# Patient Record
Sex: FEMALE | Race: OTHER | HISPANIC OR LATINO | Employment: FULL TIME | URBAN - METROPOLITAN AREA
[De-identification: names, ages, dates, MRNs, and addresses within clinical notes are randomized per-mention and may not be internally consistent; named-entity substitution may affect disease eponyms.]

---

## 2017-08-07 ENCOUNTER — INITIAL PRENATAL (OUTPATIENT)
Dept: OBGYN | Facility: CLINIC | Age: 27
End: 2017-08-07
Payer: MEDICAID

## 2017-08-07 VITALS
SYSTOLIC BLOOD PRESSURE: 116 MMHG | HEIGHT: 63 IN | WEIGHT: 164 LBS | BODY MASS INDEX: 29.06 KG/M2 | DIASTOLIC BLOOD PRESSURE: 68 MMHG

## 2017-08-07 DIAGNOSIS — O09.292 HISTORY OF POSTPARTUM HEMORRHAGE, CURRENTLY PREGNANT, SECOND TRIMESTER: ICD-10-CM

## 2017-08-07 DIAGNOSIS — Z34.90 ENCOUNTER FOR SUPERVISION OF NORMAL PREGNANCY, ANTEPARTUM, UNSPECIFIED GRAVIDITY: Primary | ICD-10-CM

## 2017-08-07 DIAGNOSIS — Z92.89 HISTORY OF BLOOD TRANSFUSION: ICD-10-CM

## 2017-08-07 DIAGNOSIS — O34.219 HISTORY OF CESAREAN DELIVERY, CURRENTLY PREGNANT: ICD-10-CM

## 2017-08-07 PROBLEM — O09.299 HISTORY OF POSTPARTUM HEMORRHAGE, CURRENTLY PREGNANT: Status: ACTIVE | Noted: 2017-08-07

## 2017-08-07 LAB
APPEARANCE UR: NORMAL
BILIRUB UR STRIP-MCNC: NORMAL MG/DL
COLOR UR AUTO: NORMAL
GLUCOSE UR STRIP.AUTO-MCNC: NEGATIVE MG/DL
KETONES UR STRIP.AUTO-MCNC: NEGATIVE MG/DL
LEUKOCYTE ESTERASE UR QL STRIP.AUTO: NEGATIVE
NITRITE UR QL STRIP.AUTO: NEGATIVE
PH UR STRIP.AUTO: 6.5 [PH] (ref 5–8)
PROT UR QL STRIP: NEGATIVE MG/DL
RBC UR QL AUTO: NEGATIVE
SP GR UR STRIP.AUTO: 1
UROBILINOGEN UR STRIP-MCNC: NORMAL MG/DL

## 2017-08-07 PROCEDURE — 81002 URINALYSIS NONAUTO W/O SCOPE: CPT | Performed by: NURSE PRACTITIONER

## 2017-08-07 PROCEDURE — 59401 PR NEW OB VISIT: CPT | Performed by: NURSE PRACTITIONER

## 2017-08-07 ASSESSMENT — ENCOUNTER SYMPTOMS
NEUROLOGICAL NEGATIVE: 1
CONSTITUTIONAL NEGATIVE: 1
RESPIRATORY NEGATIVE: 1
PSYCHIATRIC NEGATIVE: 1
EYES NEGATIVE: 1
MUSCULOSKELETAL NEGATIVE: 1
CARDIOVASCULAR NEGATIVE: 1
GASTROINTESTINAL NEGATIVE: 1

## 2017-08-07 NOTE — PROGRESS NOTES
"S:  Bisi BLAKE is a 27 y.o.  who presents for her new OB exam.  She is 13w1d with and PAMELA of Estimated Date of Delivery: 18 based off of LMP . She has no complaints.  She is currently working at unemployed. Discussed heavy lifting and chemical exposure. No ER visits or previous care in this pregnancy.     Desires AFP.  Declines CF.  Denies VB, LOF, or cramping.  Denies dysuria, vaginal DC. Reports some fetal movement.     Pt is  and lives with  and children.  Pregnancy is planned and desired. Desires BTL with this c/s.      Past Medical History   Diagnosis Date   • Migraine    • History of  10/9/2009     History reviewed. No pertinent family history.  Social History     Social History   • Marital Status: Single     Spouse Name: N/A   • Number of Children: N/A   • Years of Education: N/A     Occupational History   • Not on file.     Social History Main Topics   • Smoking status: Never Smoker    • Smokeless tobacco: Not on file   • Alcohol Use: No   • Drug Use: No   • Sexual Activity:     Partners: Male      Comment: Planned pregnancy     Other Topics Concern   • Not on file     Social History Narrative     OB History    Para Term  AB SAB TAB Ectopic Multiple Living   3 2 2       2      # Outcome Date GA Lbr Benji/2nd Weight Sex Delivery Anes PTL Lv   3 Current            2 Term 14 39w0d  2.807 kg (6 lb 3 oz) M CS-LTranv Spinal  Y      Comments: repeat c/section   1 Term 09 40w0d  3.629 kg (8 lb) F CS-LTranv Spinal N Y      Comments: Failure to progress          History of Varicella Virus: yes  History of HSV I or II in self or partner: no  History of Thyroid problems: no    O:  Blood pressure 116/68, height 1.59 m (5' 2.6\"), weight 74.39 kg (164 lb), last menstrual period 2017, currently breastfeeding.   See Prenatal Physical.    Wet mount: deferred, no s/sx      A:   1.  IUP @ 13w1d per LMP        2.  S=D        3.  See problem list below     " "         Patient Active Problem List    Diagnosis Date Noted   • History of  delivery, currently pregnant 2017   • History of postpartum hemorrhage, currently pregnant 2017         P:  1.  GC/CT & pap done        2.  Prenatal labs ordered - lab slip given        3.  Discussed PNV, diet, avoidances and adequate water intake        4.  NOB packet given        5.  Return to office in 4 wks        6.  Complete OB US in 6-7 wks           No orders of the defined types were placed in this encounter.       HPI    Review of Systems   Constitutional: Negative.    HENT: Negative.    Eyes: Negative.    Respiratory: Negative.    Cardiovascular: Negative.    Gastrointestinal: Negative.    Genitourinary: Negative.    Musculoskeletal: Negative.    Skin: Negative.    Neurological: Negative.    Endo/Heme/Allergies: Negative.    Psychiatric/Behavioral: Negative.    All other systems reviewed and are negative.         Objective:     /68 mmHg  Ht 1.59 m (5' 2.6\")  Wt 74.39 kg (164 lb)  BMI 29.43 kg/m2  LMP 2017     Physical Exam   Constitutional: She is oriented to person, place, and time. She appears well-developed and well-nourished.   HENT:   Head: Normocephalic and atraumatic.   Nose: Nose normal.   Eyes: Conjunctivae and EOM are normal.   Neck: Normal range of motion. Neck supple.   Cardiovascular: Normal rate, regular rhythm, normal heart sounds and intact distal pulses.    Pulmonary/Chest: Effort normal and breath sounds normal.   Abdominal: Soft. Bowel sounds are normal.   Genitourinary: Vagina normal. Uterus is enlarged.   Musculoskeletal: Normal range of motion.   Neurological: She is alert and oriented to person, place, and time. She has normal reflexes.   Skin: Skin is warm and dry.   Psychiatric: She has a normal mood and affect. Her behavior is normal. Judgment and thought content normal.   Nursing note and vitals reviewed.         Assessment/Plan:     1. Encounter for supervision of " normal pregnancy, antepartum, unspecified   PAMELA 18 per LMP  - POCT Urinalysis  - US-OB 2ND 3RD TRI COMPLETE; Future  - PREG CNTR PRENATAL PN; Future  - THINPREP RFLX HPV ASCUS W/CTNG; Future    2. History of  delivery, currently pregnant  C/S x 2, desires repeat with BTL    3. History of postpartum hemorrhage, currently pregnant, second trimester  Required transfusion, 2U PRBC's

## 2017-08-07 NOTE — Clinical Note
Cystic Fibrosis Carrier Testing  Bisi BLAKE    The following information is about a blood test that can be done to determine if you and/or your partner carry the gene for cystic fibrosis.    WHAT IS CYSTIC FIBROSIS?  · Cystic fibrosis (CF) is an inherited disease that affects more than 25,000 American children and young adults.  · Symptoms of CF vary but include lung congestion, pneumonia, diarrhea and poor growth.  Most people with CF have severe medical problems and some die at a young age.  Others have so few symptoms they are unaware they have CF.  · CF does not affect intelligence.  · Although there is no cure for CF at this time, scientists are making progress in improving treatment and in searching for a cure.  In the past many people with CF  at a very young age.  Today, many are living into their 20’s and 30’s.    IS THERE A CHANCE MY BABY COULD HAVE CYSTIC FIBROSIS?  · You can have a child with CF even if there is no history in your family (see chart below).  · CF testing can help determine if you are a carrier and at risk to have a child with CF.  Note: if both parents are carriers, there is a 1 in 4 (25%) chance with each pregnancy that they will have a child with CF.  · Carriers have one normal CF gene and one altered CF gene.  · People with CF have two altered CF genes.  · Most people have two normal copies of the CF gene.    Approximate risk that a couple with no family history of cystic fibrosis will have a child with cystic fibrosis:    Ethnic background / Risk     couple:  1 in 2,500   couple:  1 in 15,000            couple:  1 in 8,000     American couple:  1 in 32,000     WHAT TESTING IS AVAILABLE?  · There is a blood test that can be done to find out if you or your partner is a carrier.  · It is important to understand that CF carrier testing does not detect all CF carriers.  · If the test shows that you are both CF carriers, you unborn baby  can be tested to find out if the baby has CF.    HOW MUCH DOES IT COST TO HAVE CYSTIC FIBROSIS CARRIER TESTING?  · Cost and insurance coverage for CF carrier testing vary depending upon the laboratory used and your insurance policy.  · The average cost for CF carrier testing is $780.00 per person.  · Your genetic counselor can provide you with more information about cystic fibrosis carrier testing.    _____  Yes, I am interested in discussing carrier testing with a genetic counselor.    _____  No, I am not interested in CF carrier testing or in receiving more information about CF carrier testing.      Client signature: ________________________________________  8/7/2017

## 2017-08-07 NOTE — PROGRESS NOTES
Pt here today for NOB visit  LMP: 08/07/2017  WT: 164 lb  BP: 116/68  Preferred pharmacy verified  Pt states she get a lot of nausea and numbness on fingers.   Pt unable to void for urinalysis.   Good # [phone]

## 2017-08-07 NOTE — MR AVS SNAPSHOT
"        Bisi BLAKE   2017 9:30 AM   Initial Prenatal   MRN: [id number]    [facility]   Dept Phone:  [phone]    Description:  Female : 1990   Provider:  Faith West C.N.M.; [facility] INTA[facility]           Allergies as of 2017     No Known Allergies      You were diagnosed with     Encounter for supervision of normal pregnancy, antepartum, unspecified    [8934027]  -  Primary     History of  delivery, currently pregnant   [[id number]]       History of postpartum hemorrhage, currently pregnant, second trimester   [378336]   required blood transfusion    History of blood transfusion   [[id number]]         Vital Signs     Blood Pressure Height Weight Body Mass Index Last Menstrual Period Smoking Status    116/68 mmHg 1.59 m (5' 2.6\") 74.39 kg (164 lb) 29.43 kg/m2 2017 Never Smoker       Basic Information     Date Of Birth Sex Race Ethnicity Preferred Language    1990 Female  or   Origin (Polish,Tuvaluan,German,Zac, etc) English      Your appointments     Sep 05, 2017 10:00 AM   OB Follow Up with Avani Mcbride D.N.P.   [facility] [address]  [facility] [address] [phone]              Problem List              ICD-10-CM Priority Class Noted - Resolved    History of  delivery, currently pregnant O34.219   2017 - Present    History of postpartum hemorrhage, currently pregnant O09.299   2017 - Present    History of blood transfusion Z92.89   2017 - Present      Health Maintenance        Date Due Completion Dates    IMM HEP B VACCINE (1 of 3 - Primary Series) 1990 ---    IMM HEP A VACCINE (1 of 2 - Standard Series) 3/24/1991 ---    IMM VARICELLA (CHICKENPOX) VACCINE (1 of 2 - 2 Dose Adolescent Series) 3/24/2003 ---    PAP SMEAR 10/9/2016 10/9/2013    IMM INFLUENZA (1) 2017 ---    IMM DTaP/Tdap/Td Vaccine (2 - Td) 2024            Current Immunizations    " MMR/Varicella Combined Vaccine  Incomplete    Tdap Vaccine 4/9/2014  4:50 AM,  Incomplete      Below and/or attached are the medications your provider expects you to take. Review all of your home medications and newly ordered medications with your provider and/or pharmacist. Follow medication instructions as directed by your provider and/or pharmacist. Please keep your medication list with you and share with your provider. Update the information when medications are discontinued, doses are changed, or new medications (including over-the-counter products) are added; and carry medication information at all times in the event of emergency situations     Allergies:  No Known Allergies          Medications  Valid as of: August 07, 2017 - 10:55 AM    Generic Name Brand Name Tablet Size Instructions for use    Prenatal MV-Min-Fe Fum-FA-DHA   Take  by mouth.        .                 Medicines prescribed today were sent to:     [facility] [address] [place] [address] - [address]    [address] [facility] [address]    Phone: [phone] Fax: [phone]    Open 24 Hours?: No      Medication refill instructions:       If your prescription bottle indicates you have medication refills left, it is not necessary to call your provider’s office. Please contact your pharmacy and they will refill your medication.    If your prescription bottle indicates you do not have any refills left, you may request refills at any time through one of the following ways: The online [employer] system (except Urgent Care), by calling your provider’s office, or by asking your pharmacy to contact your provider’s office with a refill request. Medication refills are processed only during regular business hours and may not be available until the next business day. Your provider may request additional information or to have a follow-up visit with you prior to refilling your medication.   *Please Note: Medication refills are assigned a new Rx number when refilled  electronically. Your pharmacy may indicate that no refills were authorized even though a new prescription for the same medication is available at the pharmacy. Please request the medicine by name with the pharmacy before contacting your provider for a refill.        Your To Do List     Future Labs/Procedures Complete By Expires    PREG CNTR PRENATAL PN  As directed 8/8/2018    THINPREP RFLX HPV ASCUS W/CTNG  As directed 8/7/2017    US-OB 2ND 3RD TRI COMPLETE  As directed 2/7/2018      Other Notes About Your Plan     BTL desired           MyChart Access Code: Activation code not generated  Current MyChart Status: Active

## 2017-08-09 LAB
C TRACH DNA SPEC QL NAA+PROBE: NORMAL
N GONORRHOEA DNA SPEC QL NAA+PROBE: NORMAL
PHYSICIAN READ PAP  881411: NORMAL

## 2017-08-16 LAB
ABO GROUP BLD: NORMAL
BLD GP AB SCN SERPL QL: NEGATIVE
HBV SURFACE AG SERPL QL IA: NON REACTIVE
HCT VFR BLD AUTO: 43.5 %
HGB BLD-MCNC: 14.1 G/DL
HIV 1 0 2 IC ZHVIC: NON REACTIVE
PLATELET # BLD AUTO: 248 10*3/UL
RH BLD: POSITIVE
RPR SER QL: NON REACTIVE
RUBV IGG SERPL IA-ACNC: NORMAL

## 2017-09-05 ENCOUNTER — ROUTINE PRENATAL (OUTPATIENT)
Dept: OBGYN | Facility: CLINIC | Age: 27
End: 2017-09-05
Payer: MEDICAID

## 2017-09-05 VITALS — DIASTOLIC BLOOD PRESSURE: 62 MMHG | WEIGHT: 169 LBS | BODY MASS INDEX: 30.32 KG/M2 | SYSTOLIC BLOOD PRESSURE: 108 MMHG

## 2017-09-05 DIAGNOSIS — O34.219 HISTORY OF CESAREAN DELIVERY, CURRENTLY PREGNANT: ICD-10-CM

## 2017-09-05 PROCEDURE — 90040 PR PRENATAL FOLLOW UP: CPT | Performed by: NURSE PRACTITIONER

## 2017-09-05 NOTE — PROGRESS NOTES
Ob f/u. + fetal movement   No VB, LOF or contractions   C/O no complaints today   Phone number #   Pharmacy verified with patient  WT= 136 lbs             VC=376/62  BTL signed today   AFP ordered

## 2017-09-05 NOTE — PROGRESS NOTES
SUBJECTIVE:  Pt is a 27 y.o.   at 17w2d  gestation. Presents today for follow-up prenatal care. Reports no issues at this time.  Reports positive  fetal movement. Denies cramping/contractions, bleeding or leaking of fluid. Denies dysuria, headaches, N/V, or other issues at this time. Generally feels well today.     OBJECTIVE:  - See prenatal vitals flow  -   Vitals:    17 0946   BP: 108/62   Weight: 76.7 kg (169 lb)      - Pertinent Labs:normal prenatal panel  - Pertinent ultrasound: scheduled.            ASSESSMENT:   - IUP at 17w2d   - S=D   -   Patient Active Problem List    Diagnosis Date Noted   • History of  delivery, currently pregnant 2017   • History of postpartum hemorrhage, currently pregnant 2017   • History of blood transfusion 2017         PLAN:  - S/sx pregnancy and labor warning signs vs general discomforts discussed  - Fetal movements and kick counts reviewed   - Adequate hydration reinforced  - Nutrition/exercise/vitamin education: continued PNV  Lab slip and instructions for AFP

## 2017-10-02 ENCOUNTER — ROUTINE PRENATAL (OUTPATIENT)
Dept: OBGYN | Facility: CLINIC | Age: 27
End: 2017-10-02
Payer: MEDICAID

## 2017-10-02 VITALS — BODY MASS INDEX: 31.04 KG/M2 | SYSTOLIC BLOOD PRESSURE: 110 MMHG | WEIGHT: 173 LBS | DIASTOLIC BLOOD PRESSURE: 72 MMHG

## 2017-10-02 DIAGNOSIS — O34.219 HISTORY OF CESAREAN DELIVERY, CURRENTLY PREGNANT: ICD-10-CM

## 2017-10-02 PROCEDURE — 90040 PR PRENATAL FOLLOW UP: CPT | Performed by: NURSE PRACTITIONER

## 2017-10-02 NOTE — PROGRESS NOTES
Ob f/u. + fetal movement, baby is moving already   No VB, LOF or contractions   C/O no complaints today   Phone number # [phone]  Pharmacy verified with patient  WT=  173 lbs            RD=881/72  Flu shot offered, pt declines

## 2017-10-05 ENCOUNTER — APPOINTMENT (OUTPATIENT)
Dept: RADIOLOGY | Facility: IMAGING CENTER | Age: 27
End: 2017-10-05
Attending: NURSE PRACTITIONER
Payer: MEDICAID

## 2017-10-05 DIAGNOSIS — Z34.90 ENCOUNTER FOR SUPERVISION OF NORMAL PREGNANCY, ANTEPARTUM, UNSPECIFIED GRAVIDITY: ICD-10-CM

## 2017-10-05 PROCEDURE — 76805 OB US >/= 14 WKS SNGL FETUS: CPT | Mod: 26 | Performed by: OBSTETRICS & GYNECOLOGY

## 2017-10-06 ENCOUNTER — DATING (OUTPATIENT)
Dept: OBGYN | Facility: CLINIC | Age: 27
End: 2017-10-06

## 2017-10-30 ENCOUNTER — ROUTINE PRENATAL (OUTPATIENT)
Dept: OBGYN | Facility: CLINIC | Age: 27
End: 2017-10-30
Payer: MEDICAID

## 2017-10-30 VITALS — SYSTOLIC BLOOD PRESSURE: 112 MMHG | BODY MASS INDEX: 32.3 KG/M2 | WEIGHT: 180 LBS | DIASTOLIC BLOOD PRESSURE: 66 MMHG

## 2017-10-30 DIAGNOSIS — O34.219 HISTORY OF CESAREAN DELIVERY, CURRENTLY PREGNANT: ICD-10-CM

## 2017-10-30 PROCEDURE — 90040 PR PRENATAL FOLLOW UP: CPT | Performed by: NURSE PRACTITIONER

## 2017-10-30 ASSESSMENT — PATIENT HEALTH QUESTIONNAIRE - PHQ9: CLINICAL INTERPRETATION OF PHQ2 SCORE: 0

## 2017-10-30 NOTE — PROGRESS NOTES
SUBJECTIVE:  Pt is a 27 y.o.   at 22w1d  gestation. Presents today for follow-up prenatal care. Reports no issues at this time.  Reports positive  fetal movement. Denies cramping/contractions, bleeding or leaking of fluid. Denies dysuria, headaches, N/V, or other issues at this time. Generally feels well today.     OBJECTIVE:  - See prenatal vitals flow  -   Vitals:    10/30/17 1348   BP: 112/66   Weight: 81.6 kg (180 lb)      - Pertinent Labs: normal prenatal panel, no AFP done  - Pertinent ultrasound: Normal fetal survey but due date change.            ASSESSMENT:   - IUP at 22w1d    - S=D   -   Patient Active Problem List    Diagnosis Date Noted   • History of  delivery, currently pregnant 2017   • History of postpartum hemorrhage, currently pregnant 2017   • History of blood transfusion 2017         PLAN:  - S/sx pregnancy and labor warning signs vs general discomforts discussed  - Fetal movements and kick counts reviewed   - Adequate hydration reinforced  - Nutrition/exercise/vitamin education: continued PNV  - Encouraged tour of LnD/childbirth education classes: contact info provided   - Anticipates repeat c/s and BTL. Anticipates glucose testing next visit.

## 2017-10-30 NOTE — PROGRESS NOTES
Ob f/u. + fetal movement baby is moving ok   No VB, LOF or contractions   C/O no complaints today   Phone number # [phone]  Pharmacy verified with patient  WT=  180 lbs            OB=049/66  Pt declines flu shot

## 2017-12-07 ENCOUNTER — ROUTINE PRENATAL (OUTPATIENT)
Dept: OBGYN | Facility: CLINIC | Age: 27
End: 2017-12-07
Payer: MEDICAID

## 2017-12-07 VITALS — DIASTOLIC BLOOD PRESSURE: 62 MMHG | BODY MASS INDEX: 32.66 KG/M2 | WEIGHT: 182 LBS | SYSTOLIC BLOOD PRESSURE: 100 MMHG

## 2017-12-07 DIAGNOSIS — O34.219 HISTORY OF CESAREAN DELIVERY, CURRENTLY PREGNANT: ICD-10-CM

## 2017-12-07 PROCEDURE — 90040 PR PRENATAL FOLLOW UP: CPT | Performed by: NURSE PRACTITIONER

## 2017-12-07 NOTE — PROGRESS NOTES
Ob f/u. + fetal movement baby is moving ok   No VB, LOF or contractions   C/O no complaints today   Phone number # [phone]  Pharmacy verified with patient  JF=581 lbs              SA=[id number] given today with instructions   Pt declines tdap   BTL signed  today

## 2017-12-07 NOTE — PROGRESS NOTES
SUBJECTIVE:  Pt is a 27 y.o.   at 27w4d  gestation. Presents today for follow-up prenatal care. Reports no issues at this time.  Reports good  fetal movement. Denies cramping/contractions, bleeding or leaking of fluid. Denies dysuria, headaches, N/V, or other issues at this time. Generally feels well today. Pt reports wanting to get Tdap Pp; education on benefits in pregnancy but pt still declined.     OBJECTIVE:  - See prenatal vitals flow  Vitals:    17 0836   BP: 100/62   Weight: 82.6 kg (182 lb)        ASSESSMENT:   - IUP at 27w4d by 18  week US   - S=D  Patient Active Problem List    Diagnosis Date Noted   • History of  delivery, currently pregnant 2017   • History of postpartum hemorrhage, currently pregnant 2017   • History of blood transfusion 2017         PLAN:  - GCT ordered today   - BTL signed for repeat c/s  - S/sx pregnancy and labor warning signs vs general discomforts discussed  - Fetal movements and kick counts reviewed   - Adequate hydration reinforced  - Nutrition/exercise/vitamin education: continued PNV  - Encouraged tour of LnD/childbirth education classes: contact info provided   - Declines TDAP vacc and Flu vacc  - Anticipatory guidance given  - RTC in 2 weeks for follow-up prenatal care

## 2017-12-07 NOTE — LETTER
"Count Your Baby's Movements  Another step to a healthy delivery    Bisi Mondragon              Dept: [phone]    How Many Weeks Pregnant? 27w4d    Date to Begin Countin2017              How to use this chart    One way for your physician to keep track of your baby's health is by knowing how often the baby moves (or \"kicks\") in your womb.  You can help your physician to do this by using this chart every day.    Every day, you should see how many hours it takes for your baby to move 10 times.  Start in the morning, as soon as you get up.    · First, write down the time your baby moves until you get to 10.  · Check off one box every time your baby moves until you get to 10.  · Write down the time you finished counting in the last column.  · Total how long it took to count up all 10 movements.  · Finally, fill in the box that shows how long this took.  After counting 10 movements, you no longer have to count any more that day.  The next morning, just start counting again as soon as you get up.    What should you call a \"movement\"?  It is hard to say, because it will feel different from one mother to another and from one pregnancy to the next.  The important thing is that you count the movements the same way throughout your pregnancy.  If you have more questions, you should ask your physician.    Count carefully every day!  SAMPLE:  Week 28    How many hours did it take to feel 10 movements?       Start  Time     1     2     3     4     5     6     7     8     9     10   Finish Time   Mon 8:20 ·  ·  ·  ·  ·  ·  ·  ·  ·  ·  11:40                  Sat               Sun                 IMPORTANT: You should contact your physician if it takes more than two hours for you to feel 10 movements.  Each morning, write down the time and start to count the movements of your baby.  Keep track by checking off one box every time you feel one movement.  When you have " "felt 10 \"kicks\", write down the time you finished counting in the last column.  Then fill in the   box (over the check abby) for the number of hours it took.  Be sure to read the complete instructions on the previous page.            "

## 2017-12-07 NOTE — PROGRESS NOTES
Ob f/u. + fetal movement   No VB, LOF or contractions   C/O  Phone number #   Pharmacy verified with patient  WT=             BP=  SHELBY given today with instructions   tdap offered today  BTL offered today

## 2017-12-19 LAB
GLUCOSE 1H P 50 G GLC PO SERPL-MCNC: NORMAL MG/DL
HCT VFR BLD AUTO: NORMAL %
HGB BLD-MCNC: NORMAL G/DL
TREPONEMA PALLIDUM IGG+IGM AB [PRESENCE] IN SERUM OR PLASMA BY IMMUNOASSAY: NOT DETECTED

## 2017-12-21 ENCOUNTER — ROUTINE PRENATAL (OUTPATIENT)
Dept: OBGYN | Facility: CLINIC | Age: 27
End: 2017-12-21
Payer: MEDICAID

## 2017-12-21 VITALS — DIASTOLIC BLOOD PRESSURE: 62 MMHG | WEIGHT: 182 LBS | SYSTOLIC BLOOD PRESSURE: 118 MMHG | BODY MASS INDEX: 32.66 KG/M2

## 2017-12-21 DIAGNOSIS — O34.219 HISTORY OF CESAREAN DELIVERY, CURRENTLY PREGNANT: ICD-10-CM

## 2017-12-21 PROCEDURE — 90040 PR PRENATAL FOLLOW UP: CPT | Performed by: NURSE PRACTITIONER

## 2017-12-21 NOTE — PROGRESS NOTES
Ob f/u. + fetal movement good  No VB, LOF or contractions   C/O no complaints today  Phone number # [phone]  Pharmacy verified with patient  WT= 182 lbs             QV=494/62  Pt would like to know about her labs results

## 2017-12-21 NOTE — PROGRESS NOTES
SUBJECTIVE:  Pt is a 27 y.o.   at 29w4d  gestation. Presents today for follow-up prenatal care. Reports no issues at this time.  Reports good  fetal movement. Denies cramping/contractions, bleeding or leaking of fluid. Denies dysuria, headaches, N/V, or other issues at this time. Generally feels well today. Reports she ate something different last nigth and had diarrhea and nausea but it has resolved this morning. No fever.     OBJECTIVE:  - See prenatal vitals flow  Vitals:    17 0827   BP: 118/62   Weight: 82.6 kg (182 lb)               ASSESSMENT:   - IUP at 29w4d by 18 week US   - S=D  Patient Active Problem List    Diagnosis Date Noted   • History of  delivery, currently pregnant 2017   • History of postpartum hemorrhage, currently pregnant 2017   • History of blood transfusion 2017         PLAN:  - BRAT diet and adequate hydration teaching   - S/sx pregnancy and labor warning signs vs general discomforts discussed  - Fetal movements and kick counts reviewed   - Adequate hydration reinforced  - Nutrition/exercise/vitamin education; continued PNV  - Declines TDAP vacc and Flu vacc  - Anticipatory guidance given  - RTC in 2 weeks for follow-up prenatal care

## 2018-01-05 ENCOUNTER — ROUTINE PRENATAL (OUTPATIENT)
Dept: OBGYN | Facility: CLINIC | Age: 28
End: 2018-01-05
Payer: MEDICAID

## 2018-01-05 VITALS — SYSTOLIC BLOOD PRESSURE: 116 MMHG | WEIGHT: 186 LBS | DIASTOLIC BLOOD PRESSURE: 70 MMHG | BODY MASS INDEX: 33.37 KG/M2

## 2018-01-05 DIAGNOSIS — O34.219 HISTORY OF CESAREAN DELIVERY, CURRENTLY PREGNANT: ICD-10-CM

## 2018-01-05 DIAGNOSIS — O09.893 SUPERVISION OF OTHER HIGH RISK PREGNANCIES, THIRD TRIMESTER: Primary | ICD-10-CM

## 2018-01-05 PROCEDURE — 90040 PR PRENATAL FOLLOW UP: CPT | Performed by: NURSE PRACTITIONER

## 2018-01-05 NOTE — PROGRESS NOTES
Pt here today for OB follow up  Pt states been having some cramping  Reports +ALLEN Mejias # [phone]  Pharmacy Confirmed.

## 2018-01-05 NOTE — PROGRESS NOTES
S:  Pt is  at 31w5d for routine OB follow up.  Reports an occ cramp and low back pain.  Reports good FM.  Denies VB, LOF, RUCs or vaginal DC.    O:  Please see above vitals.        FHTs: 143        Fundal ht: 32 cm.    A:  IUP at 31w5d  Patient Active Problem List    Diagnosis Date Noted   • History of  delivery, currently pregnant 2017   • History of postpartum hemorrhage, currently pregnant 2017   • History of blood transfusion 2017        P:  1.  GBS @ 35 wks.          2.  Continue FKCs.          3.  Questions answered.          4.  Encouraged pt to tour L&D.          5.  Encourage adequate water intake.        6.  F/u 2 wks.        7.  D/w pt helps for low back pain.  Encouraged pregnancy belt.         8.  PTL precautions reviewed w pt.

## 2018-01-19 ENCOUNTER — ROUTINE PRENATAL (OUTPATIENT)
Dept: OBGYN | Facility: CLINIC | Age: 28
End: 2018-01-19
Payer: MEDICAID

## 2018-01-19 VITALS — WEIGHT: 186 LBS | SYSTOLIC BLOOD PRESSURE: 110 MMHG | BODY MASS INDEX: 33.37 KG/M2 | DIASTOLIC BLOOD PRESSURE: 58 MMHG

## 2018-01-19 DIAGNOSIS — O34.219 HISTORY OF CESAREAN DELIVERY, CURRENTLY PREGNANT: ICD-10-CM

## 2018-01-19 PROCEDURE — 90040 PR PRENATAL FOLLOW UP: CPT | Performed by: PHYSICIAN ASSISTANT

## 2018-01-19 NOTE — PROGRESS NOTES
Pt here today for OB follow up  Reports +FM  WT: 186 lb  BP: 110/58  Pt states she had spotting spotting one time 3 days ago, also states noticing more mucus discharge.   Good # [phone]

## 2018-01-19 NOTE — PROGRESS NOTES
Pt has no complaints with cramping, UCs, Vb, LOF, though pt has occ abd tightness only. +FM. Pt had spotting 3 days ago with incr pressure, but none since, pt also notes more vag pressure at that time. PTL precautions stressed. Daily FKC recommended. GBS next visit, also will schedule repeat C/S and BTL at that time. Declines flu and TDap vaccines. RTC 2 wk or sooner prn.

## 2018-01-30 ENCOUNTER — TELEPHONE (OUTPATIENT)
Dept: OBGYN | Facility: CLINIC | Age: 28
End: 2018-01-30

## 2018-01-30 NOTE — TELEPHONE ENCOUNTER
Late Entry--- Pt LM stating she was having 3 UC in an hr on 1-28-18 and after a few hours they have slowed down to once every hr, also reports + mucous discharge and good fetal movement; denies LOF. I consulted with Dr. Roberts and he recommends patient to rest, hydrate and keep an eye on the contractions and time them, if they become 3-5 min apart, has sims than 6 UC in an hr, has heave VB, LOF or baby is not moving then patient needs to go to L&D for further evaluation. I notified patient of above recommendations and she voiced understanding and will comply. Pt had no further questions or concerns.

## 2018-02-02 ENCOUNTER — ROUTINE PRENATAL (OUTPATIENT)
Dept: OBGYN | Facility: CLINIC | Age: 28
End: 2018-02-02
Payer: MEDICAID

## 2018-02-02 VITALS — SYSTOLIC BLOOD PRESSURE: 104 MMHG | WEIGHT: 185 LBS | DIASTOLIC BLOOD PRESSURE: 60 MMHG | BODY MASS INDEX: 33.19 KG/M2

## 2018-02-02 DIAGNOSIS — O34.219 HISTORY OF CESAREAN DELIVERY, CURRENTLY PREGNANT: ICD-10-CM

## 2018-02-02 DIAGNOSIS — O09.893 SUPERVISION OF OTHER HIGH RISK PREGNANCIES, THIRD TRIMESTER: Primary | ICD-10-CM

## 2018-02-02 PROCEDURE — 90040 PR PRENATAL FOLLOW UP: CPT | Performed by: PHYSICIAN ASSISTANT

## 2018-02-02 NOTE — PROGRESS NOTES
Pt. here for Ob f/u and GBS today. Good # [phone]  Good FM  Pt states had contractions on Tuesday 1/30/18 that were 45 minutes apart lasting 3 hours and then we go away.  Pharmacy verified.

## 2018-02-02 NOTE — PROGRESS NOTES
Pt has no complaints with cramping, strong or regular UCs, VB, LOF, though pt notes she has had occ UCs, up to q 45 min, but they always stop. +FM. GBS done today. Referral for C/S placed for 39 wk today. Labor precautions reviewed. Daily FKC and walks recommended. Cervix: Cl/50/floating but vtx by leopolds. RTC 1 wk or sooner prn.

## 2018-02-04 LAB — GP B STREP DNA SPEC QL NAA+PROBE: NORMAL

## 2018-02-09 ENCOUNTER — ROUTINE PRENATAL (OUTPATIENT)
Dept: OBGYN | Facility: CLINIC | Age: 28
End: 2018-02-09
Payer: MEDICAID

## 2018-02-09 VITALS — SYSTOLIC BLOOD PRESSURE: 104 MMHG | BODY MASS INDEX: 33.37 KG/M2 | WEIGHT: 186 LBS | DIASTOLIC BLOOD PRESSURE: 60 MMHG

## 2018-02-09 DIAGNOSIS — O34.219 HISTORY OF CESAREAN DELIVERY, CURRENTLY PREGNANT: ICD-10-CM

## 2018-02-09 DIAGNOSIS — O09.893 SUPERVISION OF OTHER HIGH RISK PREGNANCIES, THIRD TRIMESTER: Primary | ICD-10-CM

## 2018-02-09 PROCEDURE — 90040 PR PRENATAL FOLLOW UP: CPT | Performed by: NURSE PRACTITIONER

## 2018-02-09 NOTE — PATIENT INSTRUCTIONS
P:  1.  Continue FKCs.         2.  Labor precautions given.  Instructions given on where to go.  Pt receptive to education.         3.  D/w pt RCS policy.  Instructions given to pt       4.  Questions answered.         5.  Encouraged adequate water intake       6.  F/u 1wk

## 2018-02-09 NOTE — PROGRESS NOTES
S:  Pt is  at 36w5d here for routine OB follow up.  No c/o.  Reports good FM.  Denies VB, LOF, RUCs, or vaginal DC.     O:  Please see above vitals.        FHTs: 145        Fundal ht: 37        Fetal position: vertex        SVE: deferred        GBS neg on 18 -- reviewed w pt.      A:  IUP at 36w5d  Patient Active Problem List    Diagnosis Date Noted   • Supervision of other high risk pregnancies, third trimester 2018   • History of C/S x 2 - desires repeat and BTL (both consents signed) 2017   • History of postpartum hemorrhage, currently pregnant 2017   • History of blood transfusion 2017       P:  1.  Continue FKCs.         2.  Labor precautions given.  Instructions given on where to go.  Pt receptive to education.         3.  D/w pt RCS policy.  Instructions given to pt       4.  Questions answered.         5.  Encouraged adequate water intake       6.  F/u 1wk

## 2018-02-09 NOTE — PROGRESS NOTES
Pt here today for OB follow up  Pt states no complaints   Reports +FM  Good # [phone]  Pharmacy Confirmed.  GBS Negative  Patient is scheduled for Pre Op on 02/21/18 at 11:30am with Dr Roberts  Patient is scheduled for C/S on 02/25/18 at 12:00pm with Dr Roberts

## 2018-02-15 ENCOUNTER — ROUTINE PRENATAL (OUTPATIENT)
Dept: OBGYN | Facility: CLINIC | Age: 28
End: 2018-02-15
Payer: MEDICAID

## 2018-02-15 VITALS — BODY MASS INDEX: 33.55 KG/M2 | WEIGHT: 187 LBS | DIASTOLIC BLOOD PRESSURE: 70 MMHG | SYSTOLIC BLOOD PRESSURE: 98 MMHG

## 2018-02-15 DIAGNOSIS — O09.893 SUPERVISION OF OTHER HIGH RISK PREGNANCIES, THIRD TRIMESTER: Primary | ICD-10-CM

## 2018-02-15 DIAGNOSIS — O34.219 HISTORY OF CESAREAN DELIVERY, CURRENTLY PREGNANT: ICD-10-CM

## 2018-02-15 PROCEDURE — 90040 PR PRENATAL FOLLOW UP: CPT | Performed by: NURSE PRACTITIONER

## 2018-02-15 NOTE — PROGRESS NOTES
S:  Pt is  at 37w4d here for routine OB follow up.  Reports that she thinks her BP is low in the morning when she gets up.  Reports good FM.  Denies VB, LOF, RUCs, or vaginal DC.     O:  Please see above vitals.        FHTs: 139        Fundal ht: 37        Fetal position: vertex        SVE: deferred        GBS neg on 18 -- reviewed w pt.      A:  IUP at 37w4d  Patient Active Problem List    Diagnosis Date Noted   • Supervision of other high risk pregnancies, third trimester 2018   • History of C/S x 2 - desires repeat and BTL (both consents signed) 2017   • History of postpartum hemorrhage, currently pregnant 2017   • History of blood transfusion 2017       P:  1.  Continue FKCs.         2.  Labor precautions given.  Instructions given on where to go.  Pt receptive to education.         3.  D/w pt RCS policy.  Info given.       4.  Questions answered.         5.  Encouraged adequate water intake       6.  F/u 1wk

## 2018-02-15 NOTE — PATIENT INSTRUCTIONS
P:  1.  Continue FKCs.         2.  Labor precautions given.  Instructions given on where to go.  Pt receptive to education.         3.  D/w pt RCS policy.  Info given.       4.  Questions answered.         5.  Encouraged adequate water intake       6.  F/u 1wk

## 2018-02-21 ENCOUNTER — ROUTINE PRENATAL (OUTPATIENT)
Dept: OBGYN | Facility: CLINIC | Age: 28
End: 2018-02-21
Payer: MEDICAID

## 2018-02-21 VITALS — BODY MASS INDEX: 33.55 KG/M2 | WEIGHT: 187 LBS | SYSTOLIC BLOOD PRESSURE: 98 MMHG | DIASTOLIC BLOOD PRESSURE: 70 MMHG

## 2018-02-21 DIAGNOSIS — O34.219 HISTORY OF CESAREAN DELIVERY, CURRENTLY PREGNANT: ICD-10-CM

## 2018-02-21 PROCEDURE — 90040 PR PRENATAL FOLLOW UP: CPT | Performed by: OBSTETRICS & GYNECOLOGY

## 2018-02-21 NOTE — PROGRESS NOTES
OB Followup;    38w3d    Patient Active Problem List    Diagnosis Date Noted   • Supervision of other high risk pregnancies, third trimester 2018   • History of C/S x 2 - desires repeat and BTL (both consents signed) 2017   • History of postpartum hemorrhage, currently pregnant 2017   • History of blood transfusion 2017       Vitals:    18 1130   BP: (!) 98/70   Weight: 84.8 kg (187 lb)       Patient presents for followup of OB care. Currently doing well . Good fetal movement no leakage of fluid no contractions or vaginal bleeding        Size equals dates, normal fetal heart rate    Repeat  section with bilateral tubal ligation 18 at noon  Preoperative counseling performed-see dictated history and physical      Labor precautions given  Increase oral hydration  Discussed proper weight gain during pregnancy  Continue prenatal vitamins  Increase water intake  Discussed proper exercise and walking  Discussed proper shoe style utilization during pregnancy  Reviewed our group's policy on prenatal visits with all providers in the group to ensure a healthy pregnancy and delivery    Followup in  1 weeks

## 2018-02-21 NOTE — H&P
CHIEF COMPLAINT:  Repeat  section with bilateral tubal ligation.    HISTORY OF PRESENT ILLNESS:  This patient is a 27-year-old  female,    3, para 2-0-0-2, EDC on 2018, currently at 39 weeks'   intrauterine pregnancy.  The patient's OB history is complicated by previous    section x2, and the patient desires repeat  section with   tubal ligation.  The patient's OB history is otherwise uncomplicated.    PAST MEDICAL HISTORY:  Negative.    PAST SURGICAL HISTORY:  Significant for  section x2.    SOCIAL HISTORY:  Tobacco and alcohol use are negative.    ALLERGIES:  No known drug allergies.    PRESENT MEDICATIONS:  Include prenatal vitamins.    PHYSICAL EXAMINATION:  VITAL SIGNS:  On admission, vital signs stable.  Patient is afebrile.  GENERAL:  This patient is a well-developed, well-nourished female in no   apparent distress.  HEENT:  Within normal limits.  CARDIOVASCULAR:  Regular rate and rhythm, normal S1, S2, without murmurs or   gallops.  LUNGS:  Clear to auscultation bilaterally.  ABDOMEN:  Gravid, positive bowel sounds, soft, nontender without masses or   hepatosplenomegaly.  PELVIC:  Deferred.  EXTREMITIES:  Without cyanosis, clubbing or edema.  NEUROLOGIC:  Grossly intact.    LABORATORY EVALUATION:  See lab section.    IMPRESSION:  1.  Intrauterine pregnancy at 39 weeks.  2.  History of previous  section x2.  3.  Desire for permanent sterilization.    PLAN:  Admission for repeat low transverse uterine  section with   bilateral tubal ligation.    Patient has been counseled, medical assistant was present.  We discussed the   risks of surgery to include the risk of pain, bleeding, infection, damage to   any or all internal organs including but not limited to bowel, intestines,   bladder, uterus, tubes, ovaries, nerves, blood vessels, skin and baby,   possible wound infection or hematoma, possible blood transfusion with inherent   risk of AIDS  and hepatitis.  The patient has consented to blood transfusion   as needed.  We also discussed the risk of hysterectomy if needed and also   discussed the permanent nature of tubal ligation, which has a failure rate of   1 in 200s and the patient may become pregnant.  All questions answered in   detail.  We will go ahead with surgery as noted.       ____________________________________     MD DONTRELL ALVAREZ / RICHY    DD:  02/21/2018 11:48:30  DT:  02/21/2018 11:59:24    D#:  [id number]  Job#:  [id number]

## 2018-02-25 ENCOUNTER — HOSPITAL ENCOUNTER (INPATIENT)
Facility: MEDICAL CENTER | Age: 28
LOS: 3 days | End: 2018-02-28
Attending: OBSTETRICS & GYNECOLOGY | Admitting: OBSTETRICS & GYNECOLOGY
Payer: MEDICAID

## 2018-02-25 LAB
BASOPHILS # BLD AUTO: 0.3 % (ref 0–1.8)
BASOPHILS # BLD: 0.02 K/UL (ref 0–0.12)
EOSINOPHIL # BLD AUTO: 0.13 K/UL (ref 0–0.51)
EOSINOPHIL NFR BLD: 1.7 % (ref 0–6.9)
ERYTHROCYTE [DISTWIDTH] IN BLOOD BY AUTOMATED COUNT: 47.9 FL (ref 35.9–50)
HCT VFR BLD AUTO: 43 % (ref 37–47)
HGB BLD-MCNC: 13.9 G/DL (ref 12–16)
HOLDING TUBE BB 8507: NORMAL
IMM GRANULOCYTES # BLD AUTO: 0.03 K/UL (ref 0–0.11)
IMM GRANULOCYTES NFR BLD AUTO: 0.4 % (ref 0–0.9)
LYMPHOCYTES # BLD AUTO: 1.6 K/UL (ref 1–4.8)
LYMPHOCYTES NFR BLD: 21 % (ref 22–41)
MCH RBC QN AUTO: 27.3 PG (ref 27–33)
MCHC RBC AUTO-ENTMCNC: 32.3 G/DL (ref 33.6–35)
MCV RBC AUTO: 84.5 FL (ref 81.4–97.8)
MONOCYTES # BLD AUTO: 0.55 K/UL (ref 0–0.85)
MONOCYTES NFR BLD AUTO: 7.2 % (ref 0–13.4)
NEUTROPHILS # BLD AUTO: 5.29 K/UL (ref 2–7.15)
NEUTROPHILS NFR BLD: 69.4 % (ref 44–72)
NRBC # BLD AUTO: 0 K/UL
NRBC BLD-RTO: 0 /100 WBC
PLATELET # BLD AUTO: 231 K/UL (ref 164–446)
PMV BLD AUTO: 10.8 FL (ref 9–12.9)
RBC # BLD AUTO: 5.09 M/UL (ref 4.2–5.4)
WBC # BLD AUTO: 7.6 K/UL (ref 4.8–10.8)

## 2018-02-25 PROCEDURE — 700105 HCHG RX REV CODE 258: Performed by: OBSTETRICS & GYNECOLOGY

## 2018-02-25 PROCEDURE — 306828 HCHG ANES-TIME GENERAL: Performed by: OBSTETRICS & GYNECOLOGY

## 2018-02-25 PROCEDURE — 85025 COMPLETE CBC W/AUTO DIFF WBC: CPT

## 2018-02-25 PROCEDURE — 700111 HCHG RX REV CODE 636 W/ 250 OVERRIDE (IP)

## 2018-02-25 PROCEDURE — A9270 NON-COVERED ITEM OR SERVICE: HCPCS | Performed by: OBSTETRICS & GYNECOLOGY

## 2018-02-25 PROCEDURE — 305385 HCHG SURGICAL SERVICES 1/4 HOUR: Performed by: OBSTETRICS & GYNECOLOGY

## 2018-02-25 PROCEDURE — 700102 HCHG RX REV CODE 250 W/ 637 OVERRIDE(OP)

## 2018-02-25 PROCEDURE — 304964 HCHG RECOVERY ROOM TIME 1HR: Performed by: OBSTETRICS & GYNECOLOGY

## 2018-02-25 PROCEDURE — 503172 HCHG FILSHIE CLIPS (PAIR)

## 2018-02-25 PROCEDURE — 700111 HCHG RX REV CODE 636 W/ 250 OVERRIDE (IP): Performed by: OBSTETRICS & GYNECOLOGY

## 2018-02-25 PROCEDURE — 59514 CESAREAN DELIVERY ONLY: CPT

## 2018-02-25 PROCEDURE — 304966 HCHG RECOVERY SVSC TIME ADDL 1/2 HR: Performed by: OBSTETRICS & GYNECOLOGY

## 2018-02-25 PROCEDURE — 36415 COLL VENOUS BLD VENIPUNCTURE: CPT

## 2018-02-25 PROCEDURE — 700102 HCHG RX REV CODE 250 W/ 637 OVERRIDE(OP): Performed by: OBSTETRICS & GYNECOLOGY

## 2018-02-25 PROCEDURE — 4A1HXCZ MONITORING OF PRODUCTS OF CONCEPTION, CARDIAC RATE, EXTERNAL APPROACH: ICD-10-PCS | Performed by: OBSTETRICS & GYNECOLOGY

## 2018-02-25 PROCEDURE — 10907ZC DRAINAGE OF AMNIOTIC FLUID, THERAPEUTIC FROM PRODUCTS OF CONCEPTION, VIA NATURAL OR ARTIFICIAL OPENING: ICD-10-PCS | Performed by: OBSTETRICS & GYNECOLOGY

## 2018-02-25 PROCEDURE — 0UL70CZ OCCLUSION OF BILATERAL FALLOPIAN TUBES WITH EXTRALUMINAL DEVICE, OPEN APPROACH: ICD-10-PCS | Performed by: OBSTETRICS & GYNECOLOGY

## 2018-02-25 PROCEDURE — 770002 HCHG ROOM/CARE - OB PRIVATE (112)

## 2018-02-25 PROCEDURE — A9270 NON-COVERED ITEM OR SERVICE: HCPCS

## 2018-02-25 RX ORDER — DOCUSATE SODIUM 100 MG/1
100 CAPSULE, LIQUID FILLED ORAL 2 TIMES DAILY PRN
Status: CANCELLED | OUTPATIENT
Start: 2018-02-25

## 2018-02-25 RX ORDER — OXYCODONE AND ACETAMINOPHEN 10; 325 MG/1; MG/1
1 TABLET ORAL EVERY 4 HOURS PRN
Status: CANCELLED | OUTPATIENT
Start: 2018-02-25

## 2018-02-25 RX ORDER — BISACODYL 10 MG
10 SUPPOSITORY, RECTAL RECTAL PRN
Status: DISCONTINUED | OUTPATIENT
Start: 2018-02-25 | End: 2018-02-28 | Stop reason: HOSPADM

## 2018-02-25 RX ORDER — OXYCODONE HYDROCHLORIDE AND ACETAMINOPHEN 5; 325 MG/1; MG/1
1 TABLET ORAL EVERY 4 HOURS PRN
Status: DISCONTINUED | OUTPATIENT
Start: 2018-02-25 | End: 2018-02-26

## 2018-02-25 RX ORDER — ONDANSETRON 2 MG/ML
4 INJECTION INTRAMUSCULAR; INTRAVENOUS EVERY 6 HOURS PRN
Status: DISCONTINUED | OUTPATIENT
Start: 2018-02-25 | End: 2018-02-26

## 2018-02-25 RX ORDER — BISACODYL 10 MG
10 SUPPOSITORY, RECTAL RECTAL PRN
Status: CANCELLED | OUTPATIENT
Start: 2018-02-25

## 2018-02-25 RX ORDER — SODIUM CHLORIDE, SODIUM LACTATE, POTASSIUM CHLORIDE, CALCIUM CHLORIDE 600; 310; 30; 20 MG/100ML; MG/100ML; MG/100ML; MG/100ML
1500 INJECTION, SOLUTION INTRAVENOUS ONCE
Status: COMPLETED | OUTPATIENT
Start: 2018-02-25 | End: 2018-02-25

## 2018-02-25 RX ORDER — VITAMIN A ACETATE, BETA CAROTENE, ASCORBIC ACID, CHOLECALCIFEROL, .ALPHA.-TOCOPHEROL ACETATE, DL-, THIAMINE MONONITRATE, RIBOFLAVIN, NIACINAMIDE, PYRIDOXINE HYDROCHLORIDE, FOLIC ACID, CYANOCOBALAMIN, CALCIUM CARBONATE, FERROUS FUMARATE, ZINC OXIDE, CUPRIC OXIDE 3080; 12; 120; 400; 1; 1.84; 3; 20; 22; 920; 25; 200; 27; 10; 2 [IU]/1; UG/1; MG/1; [IU]/1; MG/1; MG/1; MG/1; MG/1; MG/1; [IU]/1; MG/1; MG/1; MG/1; MG/1; MG/1
1 TABLET, FILM COATED ORAL EVERY MORNING
Status: CANCELLED | OUTPATIENT
Start: 2018-02-25

## 2018-02-25 RX ORDER — CITRIC ACID/SODIUM CITRATE 334-500MG
30 SOLUTION, ORAL ORAL ONCE
Status: COMPLETED | OUTPATIENT
Start: 2018-02-25 | End: 2018-02-25

## 2018-02-25 RX ORDER — METHYLERGONOVINE MALEATE 0.2 MG/ML
0.2 INJECTION INTRAVENOUS
Status: DISCONTINUED | OUTPATIENT
Start: 2018-02-25 | End: 2018-02-28 | Stop reason: HOSPADM

## 2018-02-25 RX ORDER — DIPHENHYDRAMINE HYDROCHLORIDE 50 MG/ML
25 INJECTION INTRAMUSCULAR; INTRAVENOUS EVERY 6 HOURS PRN
Status: DISCONTINUED | OUTPATIENT
Start: 2018-02-25 | End: 2018-02-26

## 2018-02-25 RX ORDER — METHYLERGONOVINE MALEATE 0.2 MG/ML
0.2 INJECTION INTRAVENOUS
Status: CANCELLED | OUTPATIENT
Start: 2018-02-25

## 2018-02-25 RX ORDER — METOCLOPRAMIDE HYDROCHLORIDE 5 MG/ML
10 INJECTION INTRAMUSCULAR; INTRAVENOUS ONCE
Status: COMPLETED | OUTPATIENT
Start: 2018-02-25 | End: 2018-02-25

## 2018-02-25 RX ORDER — IBUPROFEN 600 MG/1
600 TABLET ORAL EVERY 6 HOURS PRN
Status: CANCELLED | OUTPATIENT
Start: 2018-02-25

## 2018-02-25 RX ORDER — METOCLOPRAMIDE HYDROCHLORIDE 5 MG/ML
10 INJECTION INTRAMUSCULAR; INTRAVENOUS EVERY 6 HOURS PRN
Status: DISCONTINUED | OUTPATIENT
Start: 2018-02-25 | End: 2018-02-26

## 2018-02-25 RX ORDER — MISOPROSTOL 200 UG/1
600 TABLET ORAL
Status: CANCELLED | OUTPATIENT
Start: 2018-02-25

## 2018-02-25 RX ORDER — DOCUSATE SODIUM 100 MG/1
100 CAPSULE, LIQUID FILLED ORAL 2 TIMES DAILY PRN
Status: DISCONTINUED | OUTPATIENT
Start: 2018-02-25 | End: 2018-02-28 | Stop reason: HOSPADM

## 2018-02-25 RX ORDER — SODIUM CHLORIDE, SODIUM LACTATE, POTASSIUM CHLORIDE, CALCIUM CHLORIDE 600; 310; 30; 20 MG/100ML; MG/100ML; MG/100ML; MG/100ML
INJECTION, SOLUTION INTRAVENOUS PRN
Status: DISCONTINUED | OUTPATIENT
Start: 2018-02-25 | End: 2018-02-28 | Stop reason: HOSPADM

## 2018-02-25 RX ORDER — SODIUM CHLORIDE, SODIUM LACTATE, POTASSIUM CHLORIDE, CALCIUM CHLORIDE 600; 310; 30; 20 MG/100ML; MG/100ML; MG/100ML; MG/100ML
INJECTION, SOLUTION INTRAVENOUS PRN
Status: CANCELLED | OUTPATIENT
Start: 2018-02-25

## 2018-02-25 RX ORDER — MAG HYDROX/ALUMINUM HYD/SIMETH 400-400-40
1 SUSPENSION, ORAL (FINAL DOSE FORM) ORAL
Status: CANCELLED | OUTPATIENT
Start: 2018-02-25

## 2018-02-25 RX ORDER — OXYCODONE HYDROCHLORIDE AND ACETAMINOPHEN 5; 325 MG/1; MG/1
1 TABLET ORAL EVERY 4 HOURS PRN
Status: CANCELLED | OUTPATIENT
Start: 2018-02-25

## 2018-02-25 RX ORDER — KETOROLAC TROMETHAMINE 30 MG/ML
30 INJECTION, SOLUTION INTRAMUSCULAR; INTRAVENOUS EVERY 6 HOURS
Status: DISPENSED | OUTPATIENT
Start: 2018-02-25 | End: 2018-02-26

## 2018-02-25 RX ORDER — VITAMIN A ACETATE, BETA CAROTENE, ASCORBIC ACID, CHOLECALCIFEROL, .ALPHA.-TOCOPHEROL ACETATE, DL-, THIAMINE MONONITRATE, RIBOFLAVIN, NIACINAMIDE, PYRIDOXINE HYDROCHLORIDE, FOLIC ACID, CYANOCOBALAMIN, CALCIUM CARBONATE, FERROUS FUMARATE, ZINC OXIDE, CUPRIC OXIDE 3080; 12; 120; 400; 1; 1.84; 3; 20; 22; 920; 25; 200; 27; 10; 2 [IU]/1; UG/1; MG/1; [IU]/1; MG/1; MG/1; MG/1; MG/1; MG/1; [IU]/1; MG/1; MG/1; MG/1; MG/1; MG/1
1 TABLET, FILM COATED ORAL EVERY MORNING
Status: DISCONTINUED | OUTPATIENT
Start: 2018-02-25 | End: 2018-02-28 | Stop reason: HOSPADM

## 2018-02-25 RX ORDER — ACETAMINOPHEN 325 MG/1
325 TABLET ORAL EVERY 4 HOURS PRN
Status: CANCELLED | OUTPATIENT
Start: 2018-02-25

## 2018-02-25 RX ORDER — SODIUM CHLORIDE, SODIUM LACTATE, POTASSIUM CHLORIDE, CALCIUM CHLORIDE 600; 310; 30; 20 MG/100ML; MG/100ML; MG/100ML; MG/100ML
INJECTION, SOLUTION INTRAVENOUS CONTINUOUS
Status: DISCONTINUED | OUTPATIENT
Start: 2018-02-25 | End: 2018-02-25 | Stop reason: HOSPADM

## 2018-02-25 RX ORDER — DIPHENHYDRAMINE HYDROCHLORIDE 50 MG/ML
12.5 INJECTION INTRAMUSCULAR; INTRAVENOUS EVERY 6 HOURS PRN
Status: DISCONTINUED | OUTPATIENT
Start: 2018-02-25 | End: 2018-02-26

## 2018-02-25 RX ORDER — MISOPROSTOL 200 UG/1
600 TABLET ORAL
Status: DISCONTINUED | OUTPATIENT
Start: 2018-02-25 | End: 2018-02-28 | Stop reason: HOSPADM

## 2018-02-25 RX ORDER — HYDROMORPHONE HYDROCHLORIDE 2 MG/ML
0.2 INJECTION, SOLUTION INTRAMUSCULAR; INTRAVENOUS; SUBCUTANEOUS
Status: DISCONTINUED | OUTPATIENT
Start: 2018-02-25 | End: 2018-02-26

## 2018-02-25 RX ORDER — SIMETHICONE 80 MG
80 TABLET,CHEWABLE ORAL 4 TIMES DAILY PRN
Status: CANCELLED | OUTPATIENT
Start: 2018-02-25

## 2018-02-25 RX ORDER — OXYCODONE HCL 5 MG/5 ML
10 SOLUTION, ORAL ORAL
Status: DISCONTINUED | OUTPATIENT
Start: 2018-02-25 | End: 2018-02-28 | Stop reason: HOSPADM

## 2018-02-25 RX ORDER — MISOPROSTOL 200 UG/1
1000 TABLET ORAL ONCE
Status: COMPLETED | OUTPATIENT
Start: 2018-02-25 | End: 2018-02-25

## 2018-02-25 RX ADMIN — FENTANYL CITRATE 50 MCG: 50 INJECTION, SOLUTION INTRAMUSCULAR; INTRAVENOUS at 14:15

## 2018-02-25 RX ADMIN — FAMOTIDINE 20 MG: 10 INJECTION, SOLUTION INTRAVENOUS at 12:17

## 2018-02-25 RX ADMIN — Medication 125 ML/HR: at 14:15

## 2018-02-25 RX ADMIN — SODIUM CITRATE AND CITRIC ACID MONOHYDRATE 30 ML: 500; 334 SOLUTION ORAL at 12:17

## 2018-02-25 RX ADMIN — METOCLOPRAMIDE 10 MG: 5 INJECTION, SOLUTION INTRAMUSCULAR; INTRAVENOUS at 12:16

## 2018-02-25 RX ADMIN — KETOROLAC TROMETHAMINE 30 MG: 30 INJECTION, SOLUTION INTRAMUSCULAR at 17:54

## 2018-02-25 RX ADMIN — SODIUM CHLORIDE, POTASSIUM CHLORIDE, SODIUM LACTATE AND CALCIUM CHLORIDE: 600; 310; 30; 20 INJECTION, SOLUTION INTRAVENOUS at 12:15

## 2018-02-25 RX ADMIN — FENTANYL CITRATE 50 MCG: 50 INJECTION, SOLUTION INTRAMUSCULAR; INTRAVENOUS at 14:21

## 2018-02-25 RX ADMIN — MISOPROSTOL 1000 MCG: 200 TABLET ORAL at 13:42

## 2018-02-25 RX ADMIN — SODIUM CHLORIDE, POTASSIUM CHLORIDE, SODIUM LACTATE AND CALCIUM CHLORIDE 1000 ML: 600; 310; 30; 20 INJECTION, SOLUTION INTRAVENOUS at 10:30

## 2018-02-25 ASSESSMENT — COPD QUESTIONNAIRES
DO YOU EVER COUGH UP ANY MUCUS OR PHLEGM?: NO/ONLY WITH OCCASIONAL COLDS OR INFECTIONS
COPD SCREENING SCORE: 0
DURING THE PAST 4 WEEKS HOW MUCH DID YOU FEEL SHORT OF BREATH: NONE/LITTLE OF THE TIME
HAVE YOU SMOKED AT LEAST 100 CIGARETTES IN YOUR ENTIRE LIFE: NO/DON'T KNOW

## 2018-02-25 ASSESSMENT — PAIN SCALES - GENERAL
PAINLEVEL_OUTOF10: 0
PAINLEVEL_OUTOF10: 1
PAINLEVEL_OUTOF10: 3
PAINLEVEL_OUTOF10: 1
PAINLEVEL_OUTOF10: 0

## 2018-02-25 ASSESSMENT — PATIENT HEALTH QUESTIONNAIRE - PHQ9
SUM OF ALL RESPONSES TO PHQ QUESTIONS 1-9: 0
1. LITTLE INTEREST OR PLEASURE IN DOING THINGS: NOT AT ALL
SUM OF ALL RESPONSES TO PHQ9 QUESTIONS 1 AND 2: 0
2. FEELING DOWN, DEPRESSED, IRRITABLE, OR HOPELESS: NOT AT ALL

## 2018-02-25 ASSESSMENT — LIFESTYLE VARIABLES
EVER_SMOKED: NEVER
ALCOHOL_USE: NO
DO YOU DRINK ALCOHOL: NO

## 2018-02-25 NOTE — OP REPORT
DATE OF SERVICE:  2018    PREOPERATIVE DIAGNOSES:  Intrauterine pregnancy at term with history of   previous  section x2, desire for permanent sterilization.    POSTOPERATIVE DIAGNOSES:  Intrauterine pregnancy at term with history of   previous  section x2, desire for permanent sterilization.    SURGEON:  Malcolm Roberts MD    ASSISTANT:  Jakob Cooley MD    ANESTHESIA:  Spinal.    ANESTHESIOLOGIST:  Clark Miller MD    ESTIMATED BLOOD LOSS:  800 mL.    COMPLICATIONS:  None.    DRAINS:  Goncalves catheter.    SPECIMEN:  Cord gas sent to pathology department.    INDICATIONS:  This patient is a 27-year-old  female  2, para 2,   2 previous  sections and desire for permanent sterilization.    FINDINGS:  Normal pelvis, normal abdomen.  Cephalic presentation, clear   amniotic fluid.    OPERATION:  After adequately being counseled, the patient was taken to the   operating room and was placed in supine position.  Spinal anesthetic was   placed.  Fetal heart tones were known to be normal before and after placement   of spinal.  A Pfannenstiel skin incision was made over the previous one, taken   down to the fascia.  The fascia was incised with scalpel and the fascial   incision was taken laterally on both sides with Franklin scissors.  Rectus fascia   was dissected off the underlying rectus muscles both superiorly and inferiorly   and the rectus muscles were split in the midline.  The peritoneal cavity was   entered sharply with Metzenbaum scissors as high as possible, taking care to   avoid any internal viscera by tenting up the peritoneal lining using   hemostats.  Next, the peritoneal incision was taken superiorly and inferiorly   and a bladder blade was placed over the bladder.  Next, the bladder flap was   performed by incising the peritoneal reflection of the bladder in the lower   uterine segment and bringing the bladder blade back over the bladder flap.    Next, a low  transverse uterine incision was made with a scalpel.  The infant   was delivered, bulb suctioned, umbilical cord was clamped and cut, and the   infant was handed off to pediatrics.  Placenta was removed from the uterus.    Uterine cavity was cleansed with a moist laparotomy sponge.  Uterus was closed   in 2 layers using 0 Vicryl in a running locked fashion.  Good hemostasis was   noted.  Next, the right fallopian tube was grasped with a Miguelangel clamp and a   Filshie clip was applied using the applicator with good application noted.    This was then performed in a similar fashion on contralateral fallopian tube.    Good hemostasis was noted in all surgical sites.  Pelvis was irrigated and   suctioned and peritoneal lining was closed using running nonlocked stitch of 0   Vicryl.  Rectus muscles were reapproximated using interrupted stitch of 0   Vicryl and the rectus fascia was closed using running nonlocked stitch of 0   Vicryl. Subcutaneous tissues were irrigated and suctioned and electrocautery   was used for hemostasis.  Several subcuticular stitches of 0 Vicryl used to   reapproximate subcutaneous tissues.  Skin was closed using surgical staples   and a pressure dressing was applied.  The patient was taken to recovery room   in good condition.  No complication noted.       ____________________________________     MD DONTRELL ALVAREZ / RICHY    DD:  02/25/2018 15:03:15  DT:  02/25/2018 15:23:43    D#:  [id number]  Job#:  [id number]

## 2018-02-25 NOTE — PROGRESS NOTES
" EDC- 3/4, EGA- 39    0950- Pt here for scheduled repeat  section with bilateral tubal ligation.  EFM/TOCO applied, VSS.  Discussed POC with pt and FOB \"Sukhi,\" verbalize understanding.  IV started, labs drawn, pt prepped for surgery.  1235- Pt moved to OR#2 ambulatory in stable condition.  1310- Delivery of a viable female, 8/9 apgars.  1345- Pt moved to PACU via gurney in stable condition.  1458- Pt moved to S312 via gurney in stable condition.  Report to MIA Abreu.  "

## 2018-02-25 NOTE — PROGRESS NOTES
Pt up from L&D via rosa. Assessment complete. VSS. Fundus firm, no bleeding. , malone and SCD's in place.  Pt oriented to room, call light, emergency light, skylight, bulb syringe and putting baby on back to sleep. Call light within reach. Will continue to monitor.

## 2018-02-26 LAB
ERYTHROCYTE [DISTWIDTH] IN BLOOD BY AUTOMATED COUNT: 47.7 FL (ref 35.9–50)
HCT VFR BLD AUTO: 34 % (ref 37–47)
HGB BLD-MCNC: 10.9 G/DL (ref 12–16)
MCH RBC QN AUTO: 27.1 PG (ref 27–33)
MCHC RBC AUTO-ENTMCNC: 32.1 G/DL (ref 33.6–35)
MCV RBC AUTO: 84.6 FL (ref 81.4–97.8)
PLATELET # BLD AUTO: 203 K/UL (ref 164–446)
PMV BLD AUTO: 10.7 FL (ref 9–12.9)
RBC # BLD AUTO: 4.02 M/UL (ref 4.2–5.4)
WBC # BLD AUTO: 9.9 K/UL (ref 4.8–10.8)

## 2018-02-26 PROCEDURE — 700105 HCHG RX REV CODE 258: Performed by: OBSTETRICS & GYNECOLOGY

## 2018-02-26 PROCEDURE — 770002 HCHG ROOM/CARE - OB PRIVATE (112)

## 2018-02-26 PROCEDURE — 36415 COLL VENOUS BLD VENIPUNCTURE: CPT

## 2018-02-26 PROCEDURE — 700111 HCHG RX REV CODE 636 W/ 250 OVERRIDE (IP): Performed by: STUDENT IN AN ORGANIZED HEALTH CARE EDUCATION/TRAINING PROGRAM

## 2018-02-26 PROCEDURE — 700102 HCHG RX REV CODE 250 W/ 637 OVERRIDE(OP)

## 2018-02-26 PROCEDURE — A9270 NON-COVERED ITEM OR SERVICE: HCPCS

## 2018-02-26 PROCEDURE — 700102 HCHG RX REV CODE 250 W/ 637 OVERRIDE(OP): Performed by: OBSTETRICS & GYNECOLOGY

## 2018-02-26 PROCEDURE — 85027 COMPLETE CBC AUTOMATED: CPT

## 2018-02-26 PROCEDURE — 700111 HCHG RX REV CODE 636 W/ 250 OVERRIDE (IP)

## 2018-02-26 PROCEDURE — A9270 NON-COVERED ITEM OR SERVICE: HCPCS | Performed by: OBSTETRICS & GYNECOLOGY

## 2018-02-26 RX ORDER — METOCLOPRAMIDE HYDROCHLORIDE 5 MG/ML
10 INJECTION INTRAMUSCULAR; INTRAVENOUS EVERY 6 HOURS PRN
Status: DISCONTINUED | OUTPATIENT
Start: 2018-02-26 | End: 2018-02-28 | Stop reason: HOSPADM

## 2018-02-26 RX ORDER — ACETAMINOPHEN 325 MG/1
325 TABLET ORAL EVERY 4 HOURS PRN
Status: DISCONTINUED | OUTPATIENT
Start: 2018-02-26 | End: 2018-02-28 | Stop reason: HOSPADM

## 2018-02-26 RX ORDER — IBUPROFEN 600 MG/1
600 TABLET ORAL EVERY 6 HOURS PRN
Status: DISCONTINUED | OUTPATIENT
Start: 2018-02-26 | End: 2018-02-28 | Stop reason: HOSPADM

## 2018-02-26 RX ORDER — MORPHINE SULFATE 4 MG/ML
4 INJECTION, SOLUTION INTRAMUSCULAR; INTRAVENOUS
Status: DISCONTINUED | OUTPATIENT
Start: 2018-02-26 | End: 2018-02-28 | Stop reason: HOSPADM

## 2018-02-26 RX ORDER — ONDANSETRON 2 MG/ML
4 INJECTION INTRAMUSCULAR; INTRAVENOUS EVERY 6 HOURS PRN
Status: DISCONTINUED | OUTPATIENT
Start: 2018-02-26 | End: 2018-02-28 | Stop reason: HOSPADM

## 2018-02-26 RX ORDER — OXYCODONE HYDROCHLORIDE 10 MG/1
10 TABLET ORAL EVERY 4 HOURS PRN
Status: DISCONTINUED | OUTPATIENT
Start: 2018-02-26 | End: 2018-02-28 | Stop reason: HOSPADM

## 2018-02-26 RX ORDER — OXYCODONE HYDROCHLORIDE AND ACETAMINOPHEN 5; 325 MG/1; MG/1
1 TABLET ORAL EVERY 4 HOURS PRN
Status: DISCONTINUED | OUTPATIENT
Start: 2018-02-26 | End: 2018-02-28 | Stop reason: HOSPADM

## 2018-02-26 RX ORDER — ONDANSETRON 4 MG/1
4 TABLET, ORALLY DISINTEGRATING ORAL EVERY 6 HOURS PRN
Status: DISCONTINUED | OUTPATIENT
Start: 2018-02-26 | End: 2018-02-28 | Stop reason: HOSPADM

## 2018-02-26 RX ADMIN — ONDANSETRON 4 MG: 4 TABLET, ORALLY DISINTEGRATING ORAL at 17:14

## 2018-02-26 RX ADMIN — KETOROLAC TROMETHAMINE 30 MG: 30 INJECTION, SOLUTION INTRAMUSCULAR at 06:20

## 2018-02-26 RX ADMIN — IBUPROFEN 600 MG: 600 TABLET, FILM COATED ORAL at 13:51

## 2018-02-26 RX ADMIN — IBUPROFEN 600 MG: 600 TABLET, FILM COATED ORAL at 20:30

## 2018-02-26 RX ADMIN — OXYCODONE HYDROCHLORIDE 10 MG: 10 TABLET ORAL at 13:51

## 2018-02-26 RX ADMIN — Medication 1 TABLET: at 07:53

## 2018-02-26 RX ADMIN — SODIUM CHLORIDE, POTASSIUM CHLORIDE, SODIUM LACTATE AND CALCIUM CHLORIDE: 600; 310; 30; 20 INJECTION, SOLUTION INTRAVENOUS at 06:20

## 2018-02-26 RX ADMIN — KETOROLAC TROMETHAMINE 30 MG: 30 INJECTION, SOLUTION INTRAMUSCULAR at 00:30

## 2018-02-26 RX ADMIN — OXYCODONE HYDROCHLORIDE AND ACETAMINOPHEN 1 TABLET: 5; 325 TABLET ORAL at 21:24

## 2018-02-26 RX ADMIN — OXYCODONE HYDROCHLORIDE AND ACETAMINOPHEN 1 TABLET: 5; 325 TABLET ORAL at 05:19

## 2018-02-26 ASSESSMENT — PAIN SCALES - GENERAL
PAINLEVEL_OUTOF10: 1
PAINLEVEL_OUTOF10: 1
PAINLEVEL_OUTOF10: 2
PAINLEVEL_OUTOF10: 8
PAINLEVEL_OUTOF10: 3
PAINLEVEL_OUTOF10: 2
PAINLEVEL_OUTOF10: 8
PAINLEVEL_OUTOF10: 1
PAINLEVEL_OUTOF10: 6
PAINLEVEL_OUTOF10: 3
PAINLEVEL_OUTOF10: 3

## 2018-02-26 NOTE — CARE PLAN
Problem: Altered physiologic condition related to postoperative  delivery  Goal: Patient physiologically stable as evidenced by normal lochia, palpable uterine involution and vital signs within normal limits  Outcome: PROGRESSING AS EXPECTED  Fundus firm, lochia scant. VSS.    Problem: Potential for postpartum infection related to surgical incision, compromised uterine condition, urinary tract or respiratory compromise  Goal: Patient will be afebrile and free from signs and symptoms of infection  Outcome: PROGRESSING AS EXPECTED  Patient has no signs or symptoms of infection. VSS

## 2018-02-26 NOTE — PROGRESS NOTES
Assumed care. Assessment complete. VSS. Fundus firm, lochia light. Incision dressing CDI. Goncalves in place, LR running at 12mL/hr. Pt denies dizziness at this time. Call light within reach. Will continue to monitor.

## 2018-02-26 NOTE — PROGRESS NOTES
Assessment complete. VSS- Fundus firm, lochia scant. POC discussed at bedside. Feeding plan discussed; helped infant to latch and pt will call for next feed. Patient would like to call if she would like pain medications throughout the night. Call light within reach.

## 2018-02-26 NOTE — PROGRESS NOTES
Post Partum Progress Note    Name:   Bisi Mondragon   Date/Time:  2018 - 6:18 AM  Chief Admitting Dx:  Pregnancy  PREVIOUS C SECTION, PERMANENT STERILIZATION, 39 WEEKS GESTATION  Labor and delivery indication for care or intervention  Delivery Type:   for repeat  Post-Op/Post Partum Days #:  1    Subjective:  Abdominal pain: yes  Ambulating:   no  Tolerating liquids:  yes  Tolerating food:  No  Flatus:   yes  BM:    no  Bleeding:   with a small amount of bleeding  Voiding:   Goncalves in place  Dizziness:   no  Feeding:   breast    Vitals:    18 0200 18 0300 18 0400 18 0500   BP: 108/72  (!) 91/67    Pulse: 86 82 80 88   Resp:    Temp:   36.8 °C (98.3 °F)    SpO2: 95% 96% 94% 95%   Weight:       Height:           Exam:  Breast: Lactating yes  Abdomen: Abdomen soft, non-tender. BS normal. No masses,  No organomegaly  Fundal Tenderness:  no  Fundus Firm: yes  Incision: dry and intact dressing in place  Below umbilicus: yes  Perineum: perineum intact  Lochia: mild  Extremities: Normal extremities, peripheral pulses and reflexes normal, no edema, redness or tenderness in the calves or thighs    Meds:  Current Facility-Administered Medications   Medication Dose   • LR infusion     • PRN oxytocin (PITOCIN) (20 Units/1000 mL) PRN for excessive uterine bleeding - See Admin Instr  125-999 mL/hr   • miSOPROStol (CYTOTEC) tablet 600 mcg  600 mcg   • methylergonovine (METHERGINE) injection 0.2 mg  0.2 mg   • docusate sodium (COLACE) capsule 100 mg  100 mg   • bisacodyl (DULCOLAX) suppository 10 mg  10 mg   • prenatal plus vitamin (STUARTNATAL 1+1) 27-1 MG tablet 1 Tab  1 Tab   • oxyCODONE (ROXICODONE) oral solution 10 mg  10 mg   • fentaNYL (SUBLIMAZE) injection 50 mcg  50 mcg   • oxytocin (PITOCIN) 20 UNITS/1000ML LR (postpartum)   mL/hr   • ketorolac (TORADOL) injection 30 mg  30 mg   • oxyCODONE-acetaminophen (PERCOCET) 5-325 MG per tablet 1 Tab  1 Tab   •  HYDROmorphone (DILAUDID) injection 0.2 mg  0.2 mg   • ePHEDrine injection 10 mg  10 mg   • ondansetron (ZOFRAN) syringe/vial injection 4 mg  4 mg   • metoclopramide (REGLAN) injection 10 mg  10 mg   • naloxone (NARCAN) 0.4 mg in NS 1,000 mL infusion  0.4 mg   • diphenhydrAMINE (BENADRYL) injection 12.5 mg  12.5 mg   • diphenhydrAMINE (BENADRYL) injection 25 mg  25 mg   • naloxone (NARCAN) 0.4 mg in NS 1,000 mL infusion  0.4 mg       Labs:   Recent Labs      18   1030  18   0513   WBC  7.6  9.9   RBC  5.09  4.02*   HEMOGLOBIN  13.9  10.9*   HEMATOCRIT  43.0  34.0*   MCV  84.5  84.6   MCH  27.3  27.1   MCHC  32.3*  32.1*   RDW  47.9  47.7   PLATELETCT  231  203   MPV  10.8  10.7       Assessment:  Chief Admitting Dx:  Pregnancy  PREVIOUS C SECTION, PERMANENT STERILIZATION, 39 WEEKS GESTATION  Labor and delivery indication for care or intervention  Delivery Type:   for repeat  Tubal Ligation:  no    Plan:  Continue routine post partum care.  Encourage breastfeeding and ambulation  ADAT  Fluid bolus since to boost UOP.   Anticipate DC home on POD#3    Daja Reese CRenettaNEM.

## 2018-02-26 NOTE — CARE PLAN
Problem: Altered physiologic condition related to postoperative  delivery  Goal: Patient physiologically stable as evidenced by normal lochia, palpable uterine involution and vital signs within normal limits    Intervention: Massage fundus as necessary to prevent excessive lochia  Fundus firm, no bleeding at this time      Problem: Potential for postpartum infection related to surgical incision, compromised uterine condition, urinary tract or respiratory compromise  Goal: Patient will be afebrile and free from signs and symptoms of infection  Outcome: PROGRESSING AS EXPECTED  Pt remains afebrile and free from signs of infection

## 2018-02-27 PROCEDURE — 700112 HCHG RX REV CODE 229: Performed by: OBSTETRICS & GYNECOLOGY

## 2018-02-27 PROCEDURE — A9270 NON-COVERED ITEM OR SERVICE: HCPCS | Performed by: OBSTETRICS & GYNECOLOGY

## 2018-02-27 PROCEDURE — 770002 HCHG ROOM/CARE - OB PRIVATE (112)

## 2018-02-27 PROCEDURE — 700102 HCHG RX REV CODE 250 W/ 637 OVERRIDE(OP): Performed by: OBSTETRICS & GYNECOLOGY

## 2018-02-27 RX ADMIN — IBUPROFEN 600 MG: 600 TABLET, FILM COATED ORAL at 05:30

## 2018-02-27 RX ADMIN — OXYCODONE HYDROCHLORIDE AND ACETAMINOPHEN 1 TABLET: 5; 325 TABLET ORAL at 05:30

## 2018-02-27 RX ADMIN — OXYCODONE HYDROCHLORIDE AND ACETAMINOPHEN 1 TABLET: 5; 325 TABLET ORAL at 12:08

## 2018-02-27 RX ADMIN — DOCUSATE SODIUM 100 MG: 100 CAPSULE ORAL at 21:00

## 2018-02-27 RX ADMIN — Medication 1 TABLET: at 07:55

## 2018-02-27 RX ADMIN — IBUPROFEN 600 MG: 600 TABLET, FILM COATED ORAL at 12:10

## 2018-02-27 RX ADMIN — OXYCODONE HYDROCHLORIDE AND ACETAMINOPHEN 1 TABLET: 5; 325 TABLET ORAL at 16:42

## 2018-02-27 RX ADMIN — OXYCODONE HYDROCHLORIDE AND ACETAMINOPHEN 1 TABLET: 5; 325 TABLET ORAL at 21:00

## 2018-02-27 RX ADMIN — DOCUSATE SODIUM 100 MG: 100 CAPSULE ORAL at 12:11

## 2018-02-27 RX ADMIN — OXYCODONE HYDROCHLORIDE AND ACETAMINOPHEN 1 TABLET: 5; 325 TABLET ORAL at 01:30

## 2018-02-27 RX ADMIN — IBUPROFEN 600 MG: 600 TABLET, FILM COATED ORAL at 19:47

## 2018-02-27 ASSESSMENT — PAIN SCALES - GENERAL
PAINLEVEL_OUTOF10: 2
PAINLEVEL_OUTOF10: 5
PAINLEVEL_OUTOF10: 4
PAINLEVEL_OUTOF10: 2
PAINLEVEL_OUTOF10: 3
PAINLEVEL_OUTOF10: 3
PAINLEVEL_OUTOF10: 4
PAINLEVEL_OUTOF10: 5

## 2018-02-27 NOTE — PROGRESS NOTES
Name:   Bisi Mondragon   Date/Time:  2018 6:29 AM  Gestational Age:  39w2d  Admit Date:   2018  Admitting Dx:   Pregnancy  PREVIOUS C SECTION, PERMANENT STERILIZATION, 39 WEEKS GESTATION  Labor and delivery indication for care or intervention    POD# 2 S/P Repeat C/S and B/L Tubal Ligation    S:  Abdominal pain yes   Ambulating   yes  Tolerating PO  Yes, normal diet  Flatus    Yes, no BMs  Bleeding   yes  Voiding   yes   Dizziness   no  Breast feeding  yes  Breast tenderness  no    O:  Pulse: 82  Blood Pressure: (!) 94/68     Temp  Av.8 °C (98.3 °F)  Min: 36.7 °C (98.1 °F)  Max: 36.9 °C (98.5 °F)  Heart: regular rate and rhythm without gallops or murmurs  Lungs: clear bases  Abdomen: flat and soft/nontender / bowelsounds present / incision clean and dry.  Extremities: non-tender  Catheter: DC'd    Intake/Output Summary (Last 24 hours) at 18 0629  Last data filed at 18 1600   Gross per 24 hour   Intake              625 ml   Output              400 ml   Net              225 ml       A:  POD# 2 S/P Repeat C/S and B/L Tubal Ligation  Stable/progressing well    P:  Routine C/S Postpartum care, continue pain management, encourage ambulation, anticipate DC POD#2     Danika Cooley M.D.

## 2018-02-27 NOTE — PROGRESS NOTES
Pt has been feeling well with no c/o dizziness, headache, or lightheadedness. Advised pt that I can remove her IV. Pt declined stating that she would feel better if she kept her IV in.

## 2018-02-27 NOTE — PROGRESS NOTES
Received report from Lois BELLAMY.  Assumed patient care. Assessment complete. Pt denies any nausea, headache, or dizziness. Pt is ambulating around the room with a steady gait. Pt states she has been drinking water throughout the day and feels better now that she is able to eat. Pt still has dressing over the  incision. Pt refuses to take it off right now. Pt states she wants to take it off later tonight. Advised pt that she can shower at any time and that the shower may help to take the dressing off a little better. Pt refused shower. Will continue postpartum care.

## 2018-02-27 NOTE — CARE PLAN
Problem: Alteration in comfort related to surgical incision and/or after birth pains  Goal: Patient is able to ambulate, care for self and infant with acceptable pain level  Outcome: PROGRESSING AS EXPECTED  Pt is walking with a steady gait. She is able to get herself up to the bathroom and to the baby's crib without assistance.   Goal: Patient verbalizes acceptable pain level  Outcome: PROGRESSING AS EXPECTED  Reviewed 0-10 Pain Scale and available pain meds with pt. Pt states that the Roxicodone made her nauseous and does not want to take that anymore. Pt states she would only like to take the Ibuprofen. Advised pt that she also has Percocet 5 mg available to her if she feels her pain is at a 4 or higher. Pt stated she will call for pain meds as needed.

## 2018-02-27 NOTE — CONSULTS
Lactation consult done.Baby crying, sounding hoarse. Placed next to breast and deep latch obtained. Strong suck with swallowing observed. Discussed size of baby's stomach and need for frequent feedings. Discussed normal number of feedings per day and feeding frenzy or cluster feeding behaviors. Enc to keep baby skin to skin while she is awake and watch for hunger cues then feed baby. Given new beginnings booklet and attention drawn to pages on feeding norms, feeding log and latch.Enc to call for help with feedings today as needed. Sign of a deep latch shown to mother.

## 2018-02-27 NOTE — PROGRESS NOTES
Assumed care. Assessment complete. VSS. Fundus firm, lochia light. Incision CDI. Pt ambulating and voiding without difficulty. Pt would like pain medication as needed. Call light within reach. Will continue to monitor.

## 2018-02-28 VITALS
TEMPERATURE: 98.1 F | SYSTOLIC BLOOD PRESSURE: 119 MMHG | HEART RATE: 88 BPM | RESPIRATION RATE: 20 BRPM | WEIGHT: 187 LBS | HEIGHT: 63 IN | BODY MASS INDEX: 33.13 KG/M2 | OXYGEN SATURATION: 98 % | DIASTOLIC BLOOD PRESSURE: 77 MMHG

## 2018-02-28 PROCEDURE — 700112 HCHG RX REV CODE 229: Performed by: OBSTETRICS & GYNECOLOGY

## 2018-02-28 PROCEDURE — A9270 NON-COVERED ITEM OR SERVICE: HCPCS | Performed by: OBSTETRICS & GYNECOLOGY

## 2018-02-28 PROCEDURE — 700102 HCHG RX REV CODE 250 W/ 637 OVERRIDE(OP): Performed by: OBSTETRICS & GYNECOLOGY

## 2018-02-28 RX ORDER — OXYCODONE HYDROCHLORIDE AND ACETAMINOPHEN 5; 325 MG/1; MG/1
1 TABLET ORAL EVERY 4 HOURS PRN
Qty: 30 TAB | Refills: 0 | Status: SHIPPED | OUTPATIENT
Start: 2018-02-28 | End: 2018-03-07

## 2018-02-28 RX ORDER — IBUPROFEN 600 MG/1
600 TABLET ORAL EVERY 6 HOURS PRN
Qty: 30 TAB | Refills: 0 | Status: SHIPPED | OUTPATIENT
Start: 2018-02-28

## 2018-02-28 RX ORDER — PSEUDOEPHEDRINE HCL 30 MG
100 TABLET ORAL 2 TIMES DAILY PRN
Qty: 60 CAP | Refills: 0 | Status: SHIPPED | OUTPATIENT
Start: 2018-02-28

## 2018-02-28 RX ADMIN — DOCUSATE SODIUM 100 MG: 100 CAPSULE ORAL at 09:56

## 2018-02-28 RX ADMIN — OXYCODONE HYDROCHLORIDE AND ACETAMINOPHEN 1 TABLET: 5; 325 TABLET ORAL at 14:02

## 2018-02-28 RX ADMIN — IBUPROFEN 600 MG: 600 TABLET, FILM COATED ORAL at 03:45

## 2018-02-28 RX ADMIN — OXYCODONE HYDROCHLORIDE AND ACETAMINOPHEN 1 TABLET: 5; 325 TABLET ORAL at 09:57

## 2018-02-28 RX ADMIN — OXYCODONE HYDROCHLORIDE AND ACETAMINOPHEN 1 TABLET: 5; 325 TABLET ORAL at 01:07

## 2018-02-28 RX ADMIN — OXYCODONE HYDROCHLORIDE AND ACETAMINOPHEN 1 TABLET: 5; 325 TABLET ORAL at 05:35

## 2018-02-28 RX ADMIN — IBUPROFEN 600 MG: 600 TABLET, FILM COATED ORAL at 09:56

## 2018-02-28 RX ADMIN — Medication 1 TABLET: at 09:56

## 2018-02-28 ASSESSMENT — PAIN SCALES - GENERAL
PAINLEVEL_OUTOF10: 4
PAINLEVEL_OUTOF10: 3
PAINLEVEL_OUTOF10: 4
PAINLEVEL_OUTOF10: 2
PAINLEVEL_OUTOF10: 2
PAINLEVEL_OUTOF10: 4
PAINLEVEL_OUTOF10: 5

## 2018-02-28 NOTE — PROGRESS NOTES
MOB declined assistance with breastfeeding at this time.  States infant is now nursing well and just fed an hour ago.  Breasts assessed.  Both nipples are cracked and scabbed.  Breasts are full and MOB encouraged to allow infant to feed for as long as possible to empty the breasts.  Explained to MOB that engorged breasts can lead to a decreased milk supply and mastitis.  MOB educated on the signs and symptoms of mastitis.    Encouraged MOB to feed infant every 2-3 hours or sooner if hunger cues are present.  Discussed what hunger cues are and what to watch for.    MOB states is already applying lanolin cream to affected nipples.  Encouraged MOB to place drops of breast milk on her nipples.  Educated MOB on the healing properties associated with expressed breast milk on cracked and scabbed nipples.    Discussed pitfalls of pacifier use with MOB.    Educated MOB on voiding/stooling pattern for infant within the first 24-48-72-96 hours following delivery.    MOB provided with information on outpatient lactation assistance available to her through WIC and the [facility].  MOB has [facility] and WIC at the [facility] location.    Encouraged MOB to call for assistance as needed.

## 2018-02-28 NOTE — DISCHARGE INSTRUCTIONS
POSTPARTUM DISCHARGE INSTRUCTIONS FOR MOM    YOB: 1990   Age: 27 y.o.               Admit Date: 2018     Discharge Date: 2018  Attending Doctor:  Malcolm Roberts M.D.                  Allergies:  Patient has no known allergies.    Discharged to home by car. Discharged via wheelchair, hospital escort: Yes.  Special equipment needed: Not Applicable  Belongings with: Personal  Be sure to schedule a follow-up appointment with your primary care doctor or any specialists as instructed.     Discharge Plan:   Diet Plan: Discussed  Activity Level: Discussed  Confirmed Follow up Appointment: Patient to Call and Schedule Appointment  Medication Reconciliation Updated: Yes  Influenza Vaccine Indication: Patient Refuses    REASONS TO CALL YOUR OBSTETRICIAN:  1.   Persistent fever or shaking chills (Temperature higher than 100.4)  2.   Heavy bleeding (soaking more than 1 pad per hour); Passing clots  3.   Foul odor from vagina  4.   Mastitis (Breast infection; breast pain, chills, fever, redness)  5.   Urinary pain, burning or frequency  6.   Episiotomy infection  7.   Abdominal incision infection  8.   Severe depression longer than 24 hours    HAND WASHING  · Prior to handling the baby.  · Before breastfeeding or bottle feeding baby.  · After using the bathroom or changing the baby's diaper.    WOUND CARE  Ask your physician for additional care instructions.  In general:    ·  Incision:      · Keep clean and dry.    · Do NOT lift anything heavier than your baby for up to 6 weeks.    · There should not be any opening or pus.      VAGINAL CARE  · Nothing inside vagina for 6 weeks: no sexual intercourse, tampons or douching.  · Bleeding may continue for 2-4 weeks.  Amount may vary.    · Call your physician for heavy bleeding which means soaking more than 1 pad per hour    BIRTH CONTROL  · It is possible to become pregnant at any time after delivery and while breastfeeding.  · Plan to discuss a method  "of birth control with your physician at your follow up visit. visit.    DIET AND ELIMINATION  · Eating more fiber (bran cereal, fruits, and vegetables) and drinking plenty of fluids will help to avoid constipation.  · Urinary frequency after childbirth is normal.    POSTPARTUM BLUES  During the first few days after birth, you may experience a sense of the \"blues\" which may include impatience, irritability or even crying.  These feeling come and go quickly.  However, as many as 1 in 10 women experience emotional symptoms known as postpartum depression.    Postpartum depression:  May start as early as the second or third day after delivery or take several weeks or months to develop.  Symptoms of \"blues\" are present, but are more intense:  Crying spells; loss of appetite; feelings of hopelessness or loss of control; fear of touching the baby; over concern or no concern at all about the baby; little or no concern about your own appearance/caring for yourself; and/or inability to sleep or excessive sleeping.  Contact your physician if you are experiencing any of these symptoms.    Crisis Hotline:  · [place] Crisis Hotline:  1-399-JUAQOMC  Or 1-331.400.1042  · [place] Crisis Hotline:  1-234.122.2259  Or 668-218-2072    PREVENTING SHAKEN BABY:  If you are angry or stressed, PUT THE BABY IN THE CRIB, step away, take some deep breaths, and wait until you are calm to care for the baby.  DO NOT SHAKE THE BABY.  You are not alone, call a supporter for help.    · Crisis Call Center 24/7 crisis line 876-104-6184 or 1-787.960.5311  · You can also text them, text \"ANSWER\" to 157634    QUIT SMOKING/TOBACCO USE:  I understand the use of any tobacco products increases my chance of suffering from future heart disease and could cause other illnesses which may shorten my life. Quitting the use of tobacco products is the single most important thing I can do to improve my health. For further information on smoking / tobacco cessation call " a Toll Free Quit Line at 1-118.804.2537 (*[employer] [place]) or 1-360.892.1929 (American Lung Association) or you can access the web based program at www.lungusa.org.    · [place] Tobacco Users Help Line:  (216) 985-7065       Toll Free: 1-606.802.6426  · Quit Tobacco Program [facility] (551)165-1765    DEPRESSION / SUICIDE RISK:  As you are discharged from this [facility], it is important to learn how to keep safe from harming yourself.    Recognize the warning signs:  · Abrupt changes in personality, positive or negative- including increase in energy   · Giving away possessions  · Change in eating patterns- significant weight changes-  positive or negative  · Change in sleeping patterns- unable to sleep or sleeping all the time   · Unwillingness or inability to communicate  · Depression  · Unusual sadness, discouragement and loneliness  · Talk of wanting to die  · Neglect of personal appearance   · Rebelliousness- reckless behavior  · Withdrawal from people/activities they love  · Confusion- inability to concentrate     If you or a loved one observes any of these behaviors or has concerns about self-harm, here's what you can do:  · Talk about it- your feelings and reasons for harming yourself  · Remove any means that you might use to hurt yourself (examples: pills, rope, extension cords, firearm)  · Get professional help from the community (Mental Health, Substance Abuse, psychological counseling)  · Do not be alone:Call your Safe Contact- someone whom you trust who will be there for you.  · Call your local CRISIS HOTLINE 503-1093 or 528-768-5241  · Call your local Children's Mobile Crisis Response Team [place] (662) 857-9941 or www.SmartCrowds  · Call the toll free National Suicide Prevention Hotlines   · National Suicide Prevention Lifeline 356-868-HBMY (6346)  · National Hope Line Network 800-SUICIDE (452-9149)    DISCHARGE SURVEY:  Thank you for choosing  [facility].  We hope we provided you with very good care.  You may be receiving a survey in the mail.  Please fill it out.  Your opinion is valuable to us.    ADDITIONAL EDUCATIONAL MATERIALS GIVEN TO PATIENT:        My signature on this form indicates that:  1.  I have reviewed and understand the above information  2.  My questions regarding this information have been answered to my satisfaction.  3.  I have formulated a plan with my discharge nurse to obtain my prescribed medication for home.

## 2018-02-28 NOTE — DISCHARGE SUMMARY
Discharge Summary:      Bisi Mondragon      Admit Date:   2018  Discharge Date:  2018     Admitting diagnosis:  Pregnancy  PREVIOUS C SECTION, PERMANENT STERILIZATION, 39 WEEKS GESTATION  Labor and delivery indication for care or intervention  Discharge Diagnosis: Status post  for repeat.  Pregnancy Complications: none  Tubal Ligation:  yes        History:  Past Medical History:   Diagnosis Date   • History of  10/9/2009   • Migraine      OB History    Para Term  AB Living   3 2 2     2   SAB TAB Ectopic Molar Multiple Live Births             2      # Outcome Date GA Lbr Benji/2nd Weight Sex Delivery Anes PTL Lv   3 Current            2 Term 14 39w0d  2.807 kg (6 lb 3 oz) M CS-LTranv Spinal  XIOMARA      Birth Comments: repeat c/section   1 Term 09 40w0d  3.629 kg (8 lb) F CS-LTranv Spinal N XIOMARA      Birth Comments: Failure to progress           Patient has no known allergies.  Patient Active Problem List    Diagnosis Date Noted   • Postpartum care following  delivery 2018   • Supervision of other high risk pregnancies, third trimester 2018   • History of C/S x 2 - desires repeat and BTL (both consents signed) 2017   • History of postpartum hemorrhage, currently pregnant 2017   • History of blood transfusion 2017        Hospital Course:   27 y.o. , now para 3, was admitted with the above mentioned diagnosis, underwent Repeat  repeat,  for repeat. Patient postpartum course was unremarkable, with progressive advancement in diet , ambulation and toleration of oral analgesia. Patient without complaints today and desires discharge.      Vitals:    18 1700 18 0800 18   BP: 102/79 (!) 94/68 103/78 105/83   Pulse: 87 82 76 82   Resp: 20 16 18 16   Temp: 36.7 °C (98.1 °F) 36.8 °C (98.2 °F) 36.8 °C (98.3 °F) 36.8 °C (98.3 °F)   SpO2: 95% 93% 96% 95%   Weight:       Height:            Current Facility-Administered Medications   Medication Dose   • ondansetron (ZOFRAN) syringe/vial injection 4 mg  4 mg    Or   • ondansetron (ZOFRAN ODT) dispertab 4 mg  4 mg   • metoclopramide (REGLAN) injection 10 mg  10 mg   • ibuprofen (MOTRIN) tablet 600 mg  600 mg   • acetaminophen (TYLENOL) tablet 325 mg  325 mg   • oxyCODONE-acetaminophen (PERCOCET) 5-325 MG per tablet 1 Tab  1 Tab   • oxyCODONE immediate release (ROXICODONE) tablet 10 mg  10 mg   • morphine (pf) 4 mg/ml injection 4 mg  4 mg   • LR infusion     • PRN oxytocin (PITOCIN) (20 Units/1000 mL) PRN for excessive uterine bleeding - See Admin Instr  125-999 mL/hr   • miSOPROStol (CYTOTEC) tablet 600 mcg  600 mcg   • methylergonovine (METHERGINE) injection 0.2 mg  0.2 mg   • docusate sodium (COLACE) capsule 100 mg  100 mg   • bisacodyl (DULCOLAX) suppository 10 mg  10 mg   • prenatal plus vitamin (STUARTNATAL 1+1) 27-1 MG tablet 1 Tab  1 Tab   • oxyCODONE (ROXICODONE) oral solution 10 mg  10 mg   • fentaNYL (SUBLIMAZE) injection 50 mcg  50 mcg   • oxytocin (PITOCIN) 20 UNITS/1000ML LR (postpartum)   mL/hr       Exam:  Breast Exam: negative  Abdomen: Abdomen soft, non-tender. BS normal. No masses,  No organomegaly  Fundus Non Tender: yes  Incision: none  Perineum: perineum intact  Extremity: extremities, peripheral pulses and reflexes normal     Labs:  Recent Labs      02/25/18   1030  02/26/18   0513   WBC  7.6  9.9   RBC  5.09  4.02*   HEMOGLOBIN  13.9  10.9*   HEMATOCRIT  43.0  34.0*   MCV  84.5  84.6   MCH  27.3  27.1   MCHC  32.3*  32.1*   RDW  47.9  47.7   PLATELETCT  231  203   MPV  10.8  10.7        Activity:   Discharge to home  Pelvic Rest x 6 weeks    Assessment:  normal postpartum course  Discharge Assessment: No areas of skin breakdown/redness; surgical incision intact/healing     Follow up: .[facility] or [facility] in 5 weeks for vaginal ; 1 week for incision check.      Discharge Meds:   Current Outpatient  Prescriptions   Medication Sig Dispense Refill   • oxyCODONE-acetaminophen (PERCOCET) 5-325 MG Tab Take 1 Tab by mouth every four hours as needed for up to 7 days. 30 Tab 0   • ibuprofen (MOTRIN) 600 MG Tab Take 1 Tab by mouth every 6 hours as needed (For cramping after delivery; do not give if patient is receiving ketorolac (Toradol)). 30 Tab 0   • docusate sodium 100 MG Cap Take 100 mg by mouth 2 times a day as needed for Constipation. 60 Cap 0       Otto Borrero, P.A.

## 2018-02-28 NOTE — PROGRESS NOTES
Received report from Lois BELLAMY.  Assumed patient care. Assessment complete.  Will continue postpartum care.

## 2018-02-28 NOTE — CARE PLAN
Problem: Potential knowledge deficit related to lack of understanding of self and  care  Goal: Patient will verbalize understanding of self and infant care  Outcome: PROGRESSING AS EXPECTED  Mom has been holding baby and attending to him when he cries.  Educated mom on feeding baby every 2-3 hours.   Goal: Patient will demonstrate ability to care for self and infant  Outcome: PROGRESSING AS EXPECTED  Patient demonstrated knowledge in care for herself and infant with feeding, changing diaper, and comforting.

## 2018-02-28 NOTE — CARE PLAN
Problem: Potential for postpartum infection related to surgical incision, compromised uterine condition, urinary tract or respiratory compromise  Goal: Patient will be afebrile and free from signs and symptoms of infection  Outcome: PROGRESSING AS EXPECTED  Staples to low transverse abdominal incision removed at 11:30 AM. Incision approximated. Incision is without erythema, swelling or drainage. Steri-stripes applied over incision with benzoin. Patient is afebrile. No signs or symptoms of infection.    Problem: Potential knowledge deficit related to lack of understanding of self and  care  Goal: Patient will verbalize understanding of self and infant care  Outcome: PROGRESSING AS EXPECTED  Patient states understanding of mother and infant care.

## 2018-03-06 ENCOUNTER — POST PARTUM (OUTPATIENT)
Dept: OBGYN | Facility: CLINIC | Age: 28
End: 2018-03-06
Payer: MEDICAID

## 2018-03-06 VITALS
DIASTOLIC BLOOD PRESSURE: 68 MMHG | SYSTOLIC BLOOD PRESSURE: 120 MMHG | HEIGHT: 63 IN | WEIGHT: 170 LBS | BODY MASS INDEX: 30.12 KG/M2

## 2018-03-06 PROCEDURE — 99024 POSTOP FOLLOW-UP VISIT: CPT | Performed by: OBSTETRICS & GYNECOLOGY

## 2018-03-06 NOTE — PROGRESS NOTES
"Bisi Mondragon Is a 27 y.o.  status post  delivery on 18. Patient is here today for incision check. Currently the patient is without complaints.  She reports Normal  BM.  Normal  appetite without nausea and vomiting. She denies fever. Pain is under good control.    /68   Ht 1.59 m (5' 2.6\")   Wt 77.1 kg (170 lb)   LMP 2017   BMI 30.50 kg/m²   ABD Abdomen soft, non-tender. BS normal. No masses or organomegaly  Incision healing normally, dry and intact      Assessment and plan  Status post  delivery  No complications incision healing well  Followup in 4 weeks for final postpartum checkup    "

## 2018-04-02 ENCOUNTER — POST PARTUM (OUTPATIENT)
Dept: OBGYN | Facility: CLINIC | Age: 28
End: 2018-04-02
Payer: MEDICAID

## 2018-04-02 VITALS
WEIGHT: 164 LBS | HEIGHT: 63 IN | DIASTOLIC BLOOD PRESSURE: 70 MMHG | SYSTOLIC BLOOD PRESSURE: 118 MMHG | BODY MASS INDEX: 29.06 KG/M2

## 2018-04-02 PROCEDURE — 90050 PR POSTPARTUM VISIT: CPT | Performed by: NURSE PRACTITIONER

## 2018-04-02 ASSESSMENT — ENCOUNTER SYMPTOMS
CARDIOVASCULAR NEGATIVE: 1
PSYCHIATRIC NEGATIVE: 1
CONSTITUTIONAL NEGATIVE: 1
EYES NEGATIVE: 1
MUSCULOSKELETAL NEGATIVE: 1
NEUROLOGICAL NEGATIVE: 1
RESPIRATORY NEGATIVE: 1
GASTROINTESTINAL NEGATIVE: 1

## 2018-04-02 NOTE — NON-PROVIDER
Pt here today for postpartum exam.  Delivery type & date: 2018    Birth control method desired: tubal ligation   Currently :breast feeding   LMP: not yet. Still spotting   Last pap:2017 normal   Phone # [phone]  Sad, or crying : No   C/O Pt report no problems today

## 2018-04-02 NOTE — PROGRESS NOTES
"Subjective:      Bisi Mondragon is a 28 y.o.  female who presents for her postpartum exam. She had a  R C/S with BTL complicated by PPH. Her prenatal course was uncomplicated. She denies dysuria, vaginal bleeding, odor, itching or breast problems. She is breastfeeding. She desires a letter for return to work. Reports having sex prior to this appointment with no problems.  Denies any S/S of PP depression.    Assessment   Assessment:    1. PP care of lactating women   2. Exam WNL   3. Pap WNL in 2017  4. Contraception not needed    Patient Active Problem List    Diagnosis Date Noted   • Postpartum care following  delivery 2018   • Supervision of other high risk pregnancies, third trimester 2018   • History of C/S x 2 - desires repeat and BTL (both consents signed) 2017   • History of postpartum hemorrhage, currently pregnant 2017   • History of blood transfusion 2017       Plan   Plan:    1. Breastfeeding support   2. Continue PNV   3. Contraceptive counseling - N/A, pt had BTL  4. Encouraged condom use for safe sex practices  5. Discussed diet, exercise and resumption of sexual activity   6. Gave copy of pap   7.  [facility] needs.           HPI    Review of Systems   Constitutional: Negative.    HENT: Negative.    Eyes: Negative.    Respiratory: Negative.    Cardiovascular: Negative.    Gastrointestinal: Negative.    Genitourinary: Negative.    Musculoskeletal: Negative.    Skin: Negative.    Neurological: Negative.    Endo/Heme/Allergies: Negative.    Psychiatric/Behavioral: Negative.           Objective:     /70   Ht 1.59 m (5' 2.6\")   Wt 74.4 kg (164 lb)   LMP 2017   BMI 29.42 kg/m²      Physical Exam   Constitutional: She is oriented to person, place, and time. She appears well-developed and well-nourished.   HENT:   Head: Normocephalic.   Eyes: Pupils are equal, round, and reactive to light.   Neck: " Normal range of motion.   Cardiovascular: Normal rate, regular rhythm and normal heart sounds.    Pulmonary/Chest: Effort normal and breath sounds normal.   Abdominal: Soft.   Genitourinary: Vagina normal and uterus normal. Rectal exam shows no tenderness. Pelvic exam was performed with patient supine. No labial fusion. There is no rash, tenderness, lesion or injury on the right labia. There is no rash, tenderness, lesion or injury on the left labia. No erythema, tenderness or bleeding in the vagina. No signs of injury around the vagina. No vaginal discharge found.   Musculoskeletal: Normal range of motion.   Neurological: She is alert and oriented to person, place, and time.   Skin: Skin is warm and dry.   Psychiatric: She has a normal mood and affect. Her behavior is normal. Judgment and thought content normal.               Assessment/Plan:     1. History of  delivery, currently pregnant

## 2018-04-02 NOTE — LETTER
April 2, 2018       Patient: Bisi Mondragon   YOB: 1990   Date of Visit: 4/2/2018         To Whom It May Concern:    It is my medical opinion that Bisi Mondragon return to full duty, no restrictions..    If you have any questions or concerns, please don't hesitate to call [phone]          Sincerely,          JOSE Amador.  Electronically Signed

## 2020-12-17 NOTE — PROGRESS NOTES
Pt here today for OB follow up  Pt states no complaints  Reports +  Good # [phone]  Pharmacy Confirmed.     17-Dec-2020

## 2022-03-31 ENCOUNTER — APPOINTMENT (OUTPATIENT)
Dept: RADIOLOGY | Facility: MEDICAL CENTER | Age: 32
End: 2022-03-31
Attending: EMERGENCY MEDICINE
Payer: MEDICAID

## 2022-03-31 ENCOUNTER — HOSPITAL ENCOUNTER (EMERGENCY)
Facility: MEDICAL CENTER | Age: 32
End: 2022-04-01
Attending: EMERGENCY MEDICINE
Payer: MEDICAID

## 2022-03-31 DIAGNOSIS — N12 PYELONEPHRITIS: ICD-10-CM

## 2022-03-31 DIAGNOSIS — R10.9 FLANK PAIN: ICD-10-CM

## 2022-03-31 LAB
APPEARANCE UR: ABNORMAL
BACTERIA #/AREA URNS HPF: ABNORMAL /HPF
BASOPHILS # BLD AUTO: 0.2 % (ref 0–1.8)
BASOPHILS # BLD: 0.02 K/UL (ref 0–0.12)
BILIRUB UR QL STRIP.AUTO: NEGATIVE
COLOR UR: YELLOW
EOSINOPHIL # BLD AUTO: 0.12 K/UL (ref 0–0.51)
EOSINOPHIL NFR BLD: 1.3 % (ref 0–6.9)
EPI CELLS #/AREA URNS HPF: NEGATIVE /HPF
ERYTHROCYTE [DISTWIDTH] IN BLOOD BY AUTOMATED COUNT: 42.8 FL (ref 35.9–50)
GLUCOSE UR STRIP.AUTO-MCNC: NEGATIVE MG/DL
HCG UR QL: NEGATIVE
HCT VFR BLD AUTO: 43 % (ref 37–47)
HGB BLD-MCNC: 13.7 G/DL (ref 12–16)
HYALINE CASTS #/AREA URNS LPF: ABNORMAL /LPF
IMM GRANULOCYTES # BLD AUTO: 0.03 K/UL (ref 0–0.11)
IMM GRANULOCYTES NFR BLD AUTO: 0.3 % (ref 0–0.9)
KETONES UR STRIP.AUTO-MCNC: NEGATIVE MG/DL
LACTATE BLD-SCNC: 1.4 MMOL/L (ref 0.5–2)
LEUKOCYTE ESTERASE UR QL STRIP.AUTO: ABNORMAL
LYMPHOCYTES # BLD AUTO: 2.56 K/UL (ref 1–4.8)
LYMPHOCYTES NFR BLD: 27.3 % (ref 22–41)
MCH RBC QN AUTO: 26.5 PG (ref 27–33)
MCHC RBC AUTO-ENTMCNC: 31.9 G/DL (ref 33.6–35)
MCV RBC AUTO: 83.2 FL (ref 81.4–97.8)
MICRO URNS: ABNORMAL
MONOCYTES # BLD AUTO: 0.65 K/UL (ref 0–0.85)
MONOCYTES NFR BLD AUTO: 6.9 % (ref 0–13.4)
NEUTROPHILS # BLD AUTO: 6 K/UL (ref 2–7.15)
NEUTROPHILS NFR BLD: 64 % (ref 44–72)
NITRITE UR QL STRIP.AUTO: NEGATIVE
NRBC # BLD AUTO: 0 K/UL
NRBC BLD-RTO: 0 /100 WBC
PH UR STRIP.AUTO: 7 [PH] (ref 5–8)
PLATELET # BLD AUTO: 380 K/UL (ref 164–446)
PMV BLD AUTO: 10.1 FL (ref 9–12.9)
PROT UR QL STRIP: 100 MG/DL
RBC # BLD AUTO: 5.17 M/UL (ref 4.2–5.4)
RBC # URNS HPF: ABNORMAL /HPF
RBC UR QL AUTO: ABNORMAL
SP GR UR STRIP.AUTO: 1.01
UROBILINOGEN UR STRIP.AUTO-MCNC: 0.2 MG/DL
WBC # BLD AUTO: 9.4 K/UL (ref 4.8–10.8)
WBC #/AREA URNS HPF: ABNORMAL /HPF

## 2022-03-31 PROCEDURE — 99284 EMERGENCY DEPT VISIT MOD MDM: CPT

## 2022-03-31 PROCEDURE — 96375 TX/PRO/DX INJ NEW DRUG ADDON: CPT

## 2022-03-31 PROCEDURE — 36415 COLL VENOUS BLD VENIPUNCTURE: CPT

## 2022-03-31 PROCEDURE — 74176 CT ABD & PELVIS W/O CONTRAST: CPT

## 2022-03-31 PROCEDURE — 85025 COMPLETE CBC W/AUTO DIFF WBC: CPT

## 2022-03-31 PROCEDURE — 96374 THER/PROPH/DIAG INJ IV PUSH: CPT

## 2022-03-31 PROCEDURE — 700111 HCHG RX REV CODE 636 W/ 250 OVERRIDE (IP): Performed by: EMERGENCY MEDICINE

## 2022-03-31 PROCEDURE — 87040 BLOOD CULTURE FOR BACTERIA: CPT | Mod: 91

## 2022-03-31 PROCEDURE — 81025 URINE PREGNANCY TEST: CPT

## 2022-03-31 PROCEDURE — 87086 URINE CULTURE/COLONY COUNT: CPT

## 2022-03-31 PROCEDURE — 87077 CULTURE AEROBIC IDENTIFY: CPT

## 2022-03-31 PROCEDURE — 87186 SC STD MICRODIL/AGAR DIL: CPT

## 2022-03-31 PROCEDURE — 83605 ASSAY OF LACTIC ACID: CPT

## 2022-03-31 PROCEDURE — 81001 URINALYSIS AUTO W/SCOPE: CPT

## 2022-03-31 RX ORDER — CEFTRIAXONE 2 G/1
2 INJECTION, POWDER, FOR SOLUTION INTRAMUSCULAR; INTRAVENOUS ONCE
Status: COMPLETED | OUTPATIENT
Start: 2022-03-31 | End: 2022-03-31

## 2022-03-31 RX ORDER — KETOROLAC TROMETHAMINE 30 MG/ML
15 INJECTION, SOLUTION INTRAMUSCULAR; INTRAVENOUS ONCE
Status: COMPLETED | OUTPATIENT
Start: 2022-03-31 | End: 2022-03-31

## 2022-03-31 RX ORDER — CEFDINIR 300 MG/1
300 CAPSULE ORAL 2 TIMES DAILY
Qty: 14 CAPSULE | Refills: 0 | Status: SHIPPED | OUTPATIENT
Start: 2022-03-31 | End: 2022-04-01

## 2022-03-31 RX ORDER — MORPHINE SULFATE 4 MG/ML
4 INJECTION INTRAVENOUS ONCE
Status: COMPLETED | OUTPATIENT
Start: 2022-03-31 | End: 2022-03-31

## 2022-03-31 RX ADMIN — KETOROLAC TROMETHAMINE 15 MG: 30 INJECTION, SOLUTION INTRAMUSCULAR at 21:56

## 2022-03-31 RX ADMIN — CEFTRIAXONE SODIUM 2 G: 2 INJECTION, POWDER, FOR SOLUTION INTRAMUSCULAR; INTRAVENOUS at 21:55

## 2022-03-31 RX ADMIN — MORPHINE SULFATE 4 MG: 4 INJECTION INTRAVENOUS at 21:42

## 2022-03-31 NOTE — LETTER
4/3/2022               Bisi Mondragon  [address] [place] [address]        Dear Bisi (MR#[id number])    As we have been unable to contact you by phone, this letter is sent in regards to your recent visit to the [facility] Emergency Department on 3/31/2022. During the visit, tests were performed to assist the physician in a medical diagnosis. A review of those tests requires that we notify you of the following:    Your urine culture and sensitivity was POSITIVE for a bacteria called Escherichia coli, and further treatment may be necessary. The currently prescribed antibiotic (sulfamethoxazole-trimethoprim) may not be effective in treating your infection. IF YOU ARE NOT FEELING BETTER PLEASE CONTACT ME AS SOON AS POSSIBLE AT THE NUMBER BELOW.       Thank you for your cooperation in the matter.    Sincerely,  ED Culture Follow-Up Staff  Rhonda Hayes, PharmD, BCPS   PGY2 Infectious Diseases Pharmacy Resident      [facility]  [address] [place] [address] [id number] [phone] (Rhonda's Phone Number)  [phone] (ED Culture Line)

## 2022-04-01 VITALS
DIASTOLIC BLOOD PRESSURE: 84 MMHG | OXYGEN SATURATION: 96 % | SYSTOLIC BLOOD PRESSURE: 127 MMHG | HEART RATE: 93 BPM | HEIGHT: 61 IN | RESPIRATION RATE: 18 BRPM | TEMPERATURE: 97.4 F | WEIGHT: 181.22 LBS | BODY MASS INDEX: 34.21 KG/M2

## 2022-04-01 RX ORDER — OXYCODONE HYDROCHLORIDE 5 MG/1
5 TABLET ORAL EVERY 6 HOURS PRN
Qty: 10 TABLET | Refills: 0 | Status: SHIPPED | OUTPATIENT
Start: 2022-04-01 | End: 2022-04-04

## 2022-04-01 NOTE — ED TRIAGE NOTES
Bisi Mondragon  32 y.o.  female  Chief Complaint   Patient presents with   • Dysuria     Pt C/o dysuria that started on Tuesday, & some spotting yesterday. Seen in UC this AM & had UA - told she had a UTI. Since this afternoon, having severe low back pain. + chills at home. Started on an oral antibiotic & told to come to ED if symptoms worsening.        Patient educated on triage process, to alert staff if any changes in condition.    Labs/UA ordered.

## 2022-04-01 NOTE — ED PROVIDER NOTES
ED Provider Note    Scribed for Sunil Celaya M.D. by Yaritza Dominguez. 3/31/2022,  8:59 PM.    Means of Arrival: Walk in   History obtained from: Patient  History limited by: None    CHIEF COMPLAINT  Chief Complaint   Patient presents with    Dysuria     Pt C/o dysuria that started on Tuesday, & some spotting yesterday. Seen in  this AM & had UA - told she had a UTI. Since this afternoon, having severe low back pain. + chills at home. Started on an oral antibiotic & told to come to ED if symptoms worsening.        HPI  Bisi Mondragon is a 32 y.o. female who presents to the Emergency Department for evaluation of dysuria onset Tuesday. Yesterday, the patient had mild spotting. The patient reports that she was seen at Urgent Care this morning where she had a urinalysis conducted. She was told that she has a UTI and was discharged with antibiotics. However, since 1PM this afternoon, she admits to worsening right lower abdominal pain, right back pain, tactile fever, and mild spotting but denies nausea or vomiting. She took Tylenol at 4PM with an hour of alleviation of pain. Her LMP was one month.       REVIEW OF SYSTEMS  CONSTITUTIONAL:  Tactile fever.  CARDIOVASCULAR:  No chest discomfort.  RESPIRATORY:  No pleuritic chest pain.  GASTROINTESTINAL:  Right lower abdominal pain. No nausea. No vomiting.   BACK: Right sided back pain.   GENITOURINARY:   Dysuria. Mild spotting.   MUSCULOSKELETAL:  No arthralgia.  SKIN:  No rash or suspicious lesions.  NEUROLOGIC:   No headache.  See HPI for further details.   All other systems are negative.     PAST MEDICAL HISTORY  Past Medical History:   Diagnosis Date    History of  10/9/2009    Migraine        FAMILY HISTORY  None noted.     SOCIAL HISTORY   reports that she has never smoked. She has never used smokeless tobacco. She reports that she does not drink alcohol and does not use drugs.    SURGICAL HISTORY  Past Surgical History:   Procedure Laterality Date     "REPEAT C SECTION W TUBAL LIGATION Bilateral 2018    Procedure: REPEAT C SECTION WITH TUBAL LIGATION;  Surgeon: Malcolm Roberts M.D.;  Location: LABOR AND DELIVERY;  Service: Labor and Delivery    REPEAT C SECTION  2014    Performed by Demarco Patel M.D. at LABOR AND DELIVERY    PRIMARY C SECTION  2009    PB  DELIVERY ONLY      failure to progress        CURRENT MEDICATIONS  Home Medications       Reviewed by Hailey Bobby R.N. (Registered Nurse) on 22 at   Med List Status: <None>     Medication Last Dose Status   docusate sodium 100 MG Cap  Active   ibuprofen (MOTRIN) 600 MG Tab  Active   Prenatal MV-Min-Fe Fum-FA-DHA (PRENATAL 1 PO)  Active                    ALLERGIES  No Known Allergies    PHYSICAL EXAM  VITAL SIGNS: BP (!) 175/101   Pulse (!) 110   Temp 36.9 °C (98.4 °F) (Temporal)   Resp 18   Ht 1.549 m (5' 1\")   Wt 82.2 kg (181 lb 3.5 oz)   LMP 2022   SpO2 98%   BMI 34.24 kg/m²    Gen: Uncomfortable appearing. Alert.  HEENT: ATNC  Eyes: PERRL, EOMI, normal conjunctiva.   Neck: trachea midline  Resp: no respiratory distress  CV: No JVD  Back: Right CVA tenderness.   Abd: Diffuse tenderness to the right upper and right lower quadrant. non-distended  Ext: No deformities  Psych: normal mood  Neuro: speech fluent     DIAGNOSTIC STUDIES / PROCEDURES     LABS  Labs Reviewed   CBC WITH DIFFERENTIAL - Abnormal; Notable for the following components:       Result Value    MCH 26.5 (*)     MCHC 31.9 (*)     All other components within normal limits   URINALYSIS - Abnormal; Notable for the following components:    Character Cloudy (*)     Protein 100 (*)     Leukocyte Esterase Large (*)     Occult Blood Large (*)     All other components within normal limits    Narrative:     Indication for culture:->Evaluation for sepsis without a  clear source of infection   URINE MICROSCOPIC (W/UA) - Abnormal; Notable for the following components:    WBC Packed (*)     RBC 20-50 (*)  " "   Bacteria Moderate (*)     Hyaline Cast 3-5 (*)     All other components within normal limits    Narrative:     Indication for culture:->Evaluation for sepsis without a  clear source of infection   LACTIC ACID   HCG QUALITATIVE UR    Narrative:     Indication for culture:->Evaluation for sepsis without a  clear source of infection   URINE CULTURE(NEW)    Narrative:     Indication for culture:->Evaluation for sepsis without a  clear source of infection   BLOOD CULTURE    Narrative:     Per Hospital Policy: Only change Specimen Src: to \"Line\" if  specified by physician order.   BLOOD CULTURE     All labs reviewed by me.    CT-RENAL COLIC EVALUATION(A/P W/O)   Final Result         1.  Hazy right perinephric and periureteral fat stranding, could represent recently passed stone or urinary tract infection. Correlate with urinalysis.   2.  Diverticulosis   3.  Hepatomegaly   4.  Cholelithiasis          COURSE & MEDICAL DECISION MAKING  Pertinent Labs & Imaging studies reviewed. (See chart for details)    8:59 PM Patient seen and examined at bedside. Patient will be treated with morphine 4 mg/mL injection 4 mg, Rocephin injection 4 mg, and Toradol injection 15 mg for her symptoms.     10:29 PM - Patient was reevaluated at bedside. The patient is still experiencing back pain. Updated plan of care.     12:00 AM - Patient was reevaluated at bedside. Discussed lab and radiology results with the patient. I discussed plan for discharge and follow up as outlined below. The patient is stable for discharge at this time and will return for any new or worsening symptoms. Patient verbalizes understanding and support with my plan for discharge.   Your urinalysis demonstrated a urinary tract infection. You are being sent home with a medication for this. A culture was sent to ensure that you were given the correct medication. Please take this as prescribed and follow up with your regular doctor.   For pain you can take acetaminophen " (Tylenol), 1000mg every 8 hours as needed for pain. Do not take more than 3000mg of acetaminophen in any 24 hour period. You can also take  ibuprofen (Motrin), 600mg every 6 hours as needed for pain (take with food to avoid GI upset).  Taking these medications regularly during the day can be very effective in controlling pain.  Please return to the ED if you develop fever, worsening flank pain, or other concerning findings.        Medical Decision Making:  Patient presents this is concerning for possible appendicitis or complicated urinary tract infection, such as kidney stone  After pain medications, the patient is feeling improved.  Urinalysis is consistent with urinary tract infectionI, however she does continue to have abdominal tenderness.  CAT scan performed demonstrates no evidence of ureterolithiasis, no inflammation evident of the appendix.  This appears to be consistent with pyelonephritis.  The patient already has a prescription for Bactrim of appropriate dose and duration.  She was encouraged to complete this course.  She was given return precautions and anticipatory guidance.       Reviewed prescription monitoring program for patient's narcotic use before prescribing a scheduled drug.The patie.nt will not drink alcohol nor drive with prescribed medications. The patient will return for new or worsening symptoms and is stable at the time of discharge.    The patient is referred to a primary physician for blood pressure management, diabetic screening, and for all other preventative health concerns.    In prescribing controlled substances to this patient, I certify that I have obtained and reviewed the medical history of Bisi Mondragon. I have also made a good jessie effort to obtain applicable records from other providers who have treated the patient and records did not demonstrate any increased risk of substance abuse that would prevent me from prescribing controlled substances.     I have conducted  a physical exam and documented it. I have reviewed Ms. Mondragon’s prescription history as maintained by the [place] Prescription Monitoring Program.     I have assessed the patient’s risk for abuse, dependency, and addiction using the validated Opioid Risk Tool available at https://www.mdcalc.com/kmrigh-jbsz-hnhx-ort-narcotic-abuse.     Given the above, I believe the benefits of controlled substance therapy outweigh the risks. The reasons for prescribing controlled substances include non-narcotic, oral analgesic alternatives have been inadequate for pain control. Accordingly, I have discussed the risk and benefits, treatment plan, and alternative therapies with the patient.       DISPOSITION:  Patient will be discharged home in stable condition.    FOLLOW UP:  [facility], Emergency Dept  [address], [place] [address] [phone]    If symptoms worsen    To establish a primary care provider within our system, please call [phone]          OUTPATIENT MEDICATIONS:  Discharge Medication List as of 4/1/2022 12:17 AM        START taking these medications    Details   oxyCODONE immediate-release (ROXICODONE) 5 MG Tab Take 1 Tablet by mouth every 6 hours as needed for Severe Pain for up to 3 days., Disp-10 Tablet, R-0, Normal             FINAL IMPRESSION  1. Pyelonephritis    2. Flank pain          Yaritza AMADO (Sejal), am scribing for, and in the presence of, Sunil Celaya M.D..    Electronically signed by: Yaritza Dominguez (Sejal), 3/31/2022    ISunil M.D. personally performed the services described in this documentation, as scribed by Yaritza Dominguez in my presence, and it is both accurate and complete.    The note accurately reflects work and decisions made by me.  Sunil Celaya M.D.  4/1/2022  1:25 AM      This dictation was created using voice recognition software. The accuracy of the dictation is limited to the abilities of the software. I expect there may be some errors  of grammar and possibly content. The nursing notes were reviewed and certain aspects of this information were incorporated into this note.

## 2022-04-01 NOTE — DISCHARGE INSTRUCTIONS
Your urinalysis demonstrated a urinary tract infection. A culture was sent to ensure that you were given the correct medication. Please take this as prescribed and follow up with your regular doctor.     The antibiotic you were prescribed at the urgent care, trimethoprim sulfamethoxazole, should be effective against this infection.  Please start taking this tomorrow 4/1 for the evening dose.  Please take the entire course of this antibiotic.    For pain you can take acetaminophen (Tylenol), 1000mg every 8 hours as needed for pain. Do not take more than 3000mg of acetaminophen in any 24 hour period. You can also take  ibuprofen (Motrin), 600mg every 6 hours as needed for pain (take with food to avoid GI upset).  Taking these medications regularly during the day can be very effective in controlling pain.    You are being sent home with an opioid pain medication. This medication causes sedation and you should not drive or operate machinery while taking it. You should not use alcohol or recreational drugs while taking this medication. Side effects of this medication can include itching, nausea, and constipation.    There is a risk of addiction to this medication and you should only take the smallest amount necessary to control your symptoms. You should use other non-opioid pain medications for your baseline pain and only use this medication if necessary.     There are many alternatives to pain medications, such as massage, acupuncture, and meditation. Check with your health insurance regarding their coverage of these complementary treatments.    We are not able to refill opioid or other controlled substance prescriptions in the emergency department. If you are having ongoing pain that requires opioids, please see your primary care provider or specialist for further prescriptions.       Please return to the ED if you develop fever, worsening flank pain, or other concerning findings.

## 2022-04-01 NOTE — ED NOTES
"Pt discharged home. IV discontinued and gauze placed, pt in possession of belongings. Pt provided discharge education and information pertaining to medications and follow up appointments. Pt received copy of discharge instructions and verbalized understanding. /84   Pulse 93   Temp 36.3 °C (97.4 °F) (Temporal)   Resp 18   Ht 1.549 m (5' 1\")   Wt 82.2 kg (181 lb 3.5 oz)   LMP 03/20/2022   SpO2 96%   BMI 34.24 kg/m²     "

## 2022-04-01 NOTE — ED NOTES
Patient to [facility]. Up for ERP. Patient says she can provide urine sample now, ambulating patient to bathroom for urine sample.

## 2022-04-03 LAB
BACTERIA UR CULT: ABNORMAL
BACTERIA UR CULT: ABNORMAL
SIGNIFICANT IND 70042: ABNORMAL
SITE SITE: ABNORMAL
SOURCE SOURCE: ABNORMAL

## 2022-04-03 NOTE — ED NOTES
ED Positive Culture Follow-up/Notification Note:   Date: 4/3/2022    Patient seen in the ED on 3/31/2022 for dysuria and flank pain.   1. Pyelonephritis    2. Flank pain      Discharge Medication List as of 4/1/2022 12:17 AM      START taking these medications    Details   oxyCODONE immediate-release (ROXICODONE) 5 MG Tab Take 1 Tablet by mouth every 6 hours as needed for Severe Pain for up to 3 days., Disp-10 Tablet, R-0, Normal           Allergies: Patient has no known allergies.    Vitals:    03/31/22 2200 03/31/22 2300 04/01/22 0000 04/01/22 0017   BP: 129/86 119/76 127/84    Pulse: 86 88 93    Resp: 18 19 18    Temp:    36.3 °C (97.4 °F)   TempSrc:    Temporal   SpO2: 94% 94% 96%    Weight:       Height:           Final cultures:   Results     Procedure Component Value Units Date/Time    URINE CULTURE(NEW) [[id number]]  (Abnormal)  (Susceptibility) Collected: 03/31/22 2105    Order Status: Completed Specimen: Urine Updated: 04/03/22 1039     Significant Indicator POS     Source UR     Site -     Culture Result Usual urogenital ping <10,000 cfu/mL      Escherichia coli  >100,000 cfu/mL  Presumptive identification      Narrative:      Indication for culture:->Evaluation for sepsis without a  clear source of infection  Indication for culture:->Evaluation for sepsis without a    Susceptibility     Escherichia coli (1)     Antibiotic Interpretation Microscan   Method Status    Amikacin  [*]  Sensitive <=16 mcg/mL SHANTI Final    Ampicillin Resistant >16 mcg/mL SHANTI Final    Amoxicillin/CA  [*]  Sensitive <=8/4 mcg/mL SHANTI Final    Aztreonam  [*]  Sensitive <=4 mcg/mL SHANTI Final    Ceftolozane+Tazobactam  [*]  Sensitive <=2 mcg/mL SHANTI Final    Ceftriaxone Sensitive <=1 mcg/mL SHANTI Final    Ceftazidime  [*]  Sensitive <=1 mcg/mL SHANTI Final    Cefazolin Sensitive <=2 mcg/mL SHANTI Final     Breakpoints when Cefazolin is used for therapy of infections  other than uncomplicated UTIs due to Enterobacterales are as  follows:  SHANTI and  "Interpretation:  <=2 S  4 I  >=8 R       Ciprofloxacin Sensitive <=0.25 mcg/mL SHANTI Final    Cefepime Sensitive <=2 mcg/mL SHANTI Final    Cefuroxime Sensitive <=4 mcg/mL SHANTI Final    Ceftazidime+Avibactam  [*]  Sensitive <=4 mcg/mL SHANTI Final    Ampicillin/sulbactam Intermediate 16/8 mcg/mL SHANTI Final    Ertapenem  [*]  Sensitive <=0.5 mcg/mL SHANTI Final    Tobramycin Sensitive <=2 mcg/mL SHANTI Final    Nitrofurantoin Sensitive <=32 mcg/mL SHANTI Final    Gentamicin Sensitive <=2 mcg/mL SHANTI Final    Imipenem  [*]  Sensitive <=1 mcg/mL SHANTI Final    Levofloxacin Sensitive <=0.5 mcg/mL SHANTI Final    Meropenem  [*]  Sensitive <=1 mcg/mL SHANTI Final    Meropenem/Vaborbactam  [*]  Sensitive <=2 mcg/mL SHANTI Final    Minocycline Sensitive <=4 mcg/mL SHANTI Final    Pip/Tazobactam Sensitive <=8 mcg/mL SHANTI Final    Trimeth/Sulfa Resistant >2/38 mcg/mL SHANTI Final    Tetracycline  [*]  Resistant >8 mcg/mL SHANTI Final    Tigecycline Sensitive <=2 mcg/mL SHANTI Final           [*]  Suppressed Antibiotic                 BLOOD CULTURE [[id number]] Collected: 03/31/22 2104    Order Status: Completed Specimen: Blood from Peripheral Updated: 04/01/22 0921     Significant Indicator NEG     Source BLD     Site PERIPHERAL     Culture Result No Growth  Note: Blood cultures are incubated for 5 days and  are monitored continuously.Positive blood cultures  are called to the RN and reported as soon as  they are identified.      Narrative:      Per Hospital Policy: Only change Specimen Src: to \"Line\" if  specified by physician order.  No site indicated    BLOOD CULTURE [[id number]] Collected: 03/31/22 2043    Order Status: Completed Specimen: Blood from Peripheral Updated: 04/01/22 0752     Significant Indicator NEG     Source BLD     Site PERIPHERAL     Culture Result No Growth  Note: Blood cultures are incubated for 5 days and  are monitored continuously.Positive blood cultures  are called to the RN and reported as soon as  they are identified.      Narrative:      " "Per Hospital Policy: Only change Specimen Src: to \"Line\" if  specified by physician order.  Left AC    URINALYSIS [[id number]]  (Abnormal) Collected: 03/31/22 2105    Order Status: Completed Specimen: Urine Updated: 03/31/22 2149     Color Yellow     Character Cloudy     Specific Gravity 1.009     Ph 7.0     Glucose Negative mg/dL      Ketones Negative mg/dL      Protein 100 mg/dL      Bilirubin Negative     Urobilinogen, Urine 0.2     Nitrite Negative     Leukocyte Esterase Large     Occult Blood Large     Micro Urine Req Microscopic    Narrative:      Indication for culture:->Evaluation for sepsis without a  clear source of infection    URINALYSIS,CULTURE IF INDICATED [[id number]]     Order Status: Canceled Specimen: Urine           Plan:   Isolated organism is resistant to prescribed therapy. Per chart review, patient as not given any antibiotics from this ER visit. However, she was given a prescription for Bactrim prior to her visit. Attempted to call patient to discuss culture results, LVM.     If patient calls back, would recommend:     New Rx:  Ciprofloxacin 500mg 1 tab PO BID x7days - IAN Ruiz per protocol    Will also send letter regarding results     4/4 4:43PM   Patient returned call and LVM. Called patient back, unfortunately no answer. LVM again.       4/4 4:51PM    Discussed culture results with patient, reports that she was also prescribed cefdinir 300mg PO BID x7 days from the ER. This is sensitive to ecoli from the urine culture, however due to its poor bioavalibity and kidney penetration, patient would prefer to take ciprofloxacin.     New Rx:  Ciprofloxacin 500mg 1 tab PO BID x7days - IAN Ruiz per protocol called in to [facility] on [address] in [place], [place].     Rhonda Hayes, PharmD, BCPS   PGY2 Infectious Diseases Pharmacy Resident      "

## 2022-04-04 RX ORDER — CIPROFLOXACIN 500 MG/1
500 TABLET, FILM COATED ORAL 2 TIMES DAILY
Qty: 14 TABLET | Refills: 0 | Status: SHIPPED | OUTPATIENT
Start: 2022-04-04 | End: 2022-04-11

## 2022-04-05 LAB
BACTERIA BLD CULT: NORMAL
SIGNIFICANT IND 70042: NORMAL
SITE SITE: NORMAL
SOURCE SOURCE: NORMAL

## 2022-04-06 LAB
BACTERIA BLD CULT: NORMAL
SIGNIFICANT IND 70042: NORMAL
SITE SITE: NORMAL
SOURCE SOURCE: NORMAL

## 2022-11-23 NOTE — PROGRESS NOTES
Patient is scheduled for Pre Op on 02/21/18 at 11:30am with Dr Roberts  Patient is scheduled for C/S on 02/25/18 at 12:00pm with Dr Roberts  Please notify and give instructions next visit. Thanks.   [FreeTextEntry1] :  A 120 G needle, 10–25 mL syringe, and 95–99% ethanol was used for the procedure. \par 1.The patient was  placed in a supine position on the bed with mild neck extension. \par 2.After skin sterilization, local anesthetic (1–2% lidocaine) is usually injected at the skin puncture site. \par 3.Depending on the viscosity of the contents in the cyst, a single 20 G needle is inserted into the center of the cystic area under US guidance. \par 4.14 cc was aspirated\par 5. Then, injected 6 cc amount of 99% ethanol into the cystic space. The amount of ethanol injected depends on the amount of aspirated material in the nodule; approximately 50% of the aspirate volume is usually injected. \par 6. Ethanol was aspirated after 5-7 minutes \par

## 2025-01-01 ENCOUNTER — HOSPITAL ENCOUNTER (EMERGENCY)
Facility: MEDICAL CENTER | Age: 35
End: 2025-01-01
Attending: EMERGENCY MEDICINE
Payer: MEDICAID

## 2025-01-01 ENCOUNTER — PHARMACY VISIT (OUTPATIENT)
Dept: PHARMACY | Facility: MEDICAL CENTER | Age: 35
End: 2025-01-01
Payer: COMMERCIAL

## 2025-01-01 VITALS
BODY MASS INDEX: 33.92 KG/M2 | RESPIRATION RATE: 18 BRPM | DIASTOLIC BLOOD PRESSURE: 86 MMHG | WEIGHT: 184.3 LBS | HEART RATE: 93 BPM | TEMPERATURE: 98 F | HEIGHT: 62 IN | OXYGEN SATURATION: 98 % | SYSTOLIC BLOOD PRESSURE: 127 MMHG

## 2025-01-01 DIAGNOSIS — J02.0 STREPTOCOCCAL PHARYNGITIS: ICD-10-CM

## 2025-01-01 LAB
FLUAV RNA SPEC QL NAA+PROBE: NEGATIVE
FLUBV RNA SPEC QL NAA+PROBE: NEGATIVE
HCG UR QL: NEGATIVE
RSV RNA SPEC QL NAA+PROBE: NEGATIVE
S PYO DNA SPEC NAA+PROBE: DETECTED
SARS-COV-2 RNA RESP QL NAA+PROBE: NOTDETECTED

## 2025-01-01 PROCEDURE — 96372 THER/PROPH/DIAG INJ SC/IM: CPT | Mod: XU

## 2025-01-01 PROCEDURE — 96375 TX/PRO/DX INJ NEW DRUG ADDON: CPT

## 2025-01-01 PROCEDURE — 0241U HCHG SARS-COV-2 COVID-19 NFCT DS RESP RNA 4 TRGT ED POC: CPT

## 2025-01-01 PROCEDURE — RXMED WILLOW AMBULATORY MEDICATION CHARGE: Performed by: EMERGENCY MEDICINE

## 2025-01-01 PROCEDURE — 81025 URINE PREGNANCY TEST: CPT

## 2025-01-01 PROCEDURE — 99284 EMERGENCY DEPT VISIT MOD MDM: CPT

## 2025-01-01 PROCEDURE — 700111 HCHG RX REV CODE 636 W/ 250 OVERRIDE (IP): Mod: JZ,UD | Performed by: EMERGENCY MEDICINE

## 2025-01-01 PROCEDURE — 87651 STREP A DNA AMP PROBE: CPT

## 2025-01-01 PROCEDURE — 96374 THER/PROPH/DIAG INJ IV PUSH: CPT

## 2025-01-01 RX ORDER — HYDROCODONE BITARTRATE AND ACETAMINOPHEN 5; 325 MG/1; MG/1
1 TABLET ORAL EVERY 4 HOURS PRN
Qty: 16 TABLET | Refills: 0 | Status: SHIPPED | OUTPATIENT
Start: 2025-01-01 | End: 2025-01-05

## 2025-01-01 RX ORDER — KETOROLAC TROMETHAMINE 15 MG/ML
15 INJECTION, SOLUTION INTRAMUSCULAR; INTRAVENOUS ONCE
Status: COMPLETED | OUTPATIENT
Start: 2025-01-01 | End: 2025-01-01

## 2025-01-01 RX ORDER — DEXAMETHASONE SODIUM PHOSPHATE 4 MG/ML
6 INJECTION, SOLUTION INTRA-ARTICULAR; INTRALESIONAL; INTRAMUSCULAR; INTRAVENOUS; SOFT TISSUE ONCE
Status: COMPLETED | OUTPATIENT
Start: 2025-01-01 | End: 2025-01-01

## 2025-01-01 RX ADMIN — DEXAMETHASONE SODIUM PHOSPHATE 6 MG: 4 INJECTION INTRA-ARTICULAR; INTRALESIONAL; INTRAMUSCULAR; INTRAVENOUS; SOFT TISSUE at 14:33

## 2025-01-01 RX ADMIN — PENICILLIN G BENZATHINE 1.2 MILLION UNITS: 1200000 INJECTION, SUSPENSION INTRAMUSCULAR at 16:12

## 2025-01-01 RX ADMIN — KETOROLAC TROMETHAMINE 15 MG: 15 INJECTION, SOLUTION INTRAMUSCULAR; INTRAVENOUS at 14:32

## 2025-01-01 NOTE — ED PROVIDER NOTES
ED Provider Note    CHIEF COMPLAINT  Chief Complaint   Patient presents with    Sore Throat     X2 days. Pt reports painful swallowing, denies difficulty breathing.     Cough     Dry non productive cough x1 week. States she hasn't been feeling well for a week, using over the counter meds with no relief.        HPI  Bisi Mondragon is a 34 y.o. female who presents for evaluation of an extremely sore throat for 2 days.  Patient notes painful swallowing but has been able to tolerate her secretions.  She has had a dry nonproductive cough for several days before onset of the sore throat.  She is having bodyaches and subjective fevers and chills.  EXTERNAL RECORDS REVIEWED  Reviewed last ED visit in  for pyelonephritis.  ROS  Constitutional: Fevers and chills noted.  Skin: No rashes  HEENT: Extremely sore throat, nasal congestion noted.  Painful swallowing noted.  Neck: No neck pain  Chest: No pain or rashes  Pulm: Nonproductive cough noted.  No shortness of breath,  wheezing, stridor, or pain with inspiration/expiration  Gastrointestinal: No nausea, vomiting, diarrhea,  or abdominal pain.  Genitourinary: No dysuria or hematuria  Musculoskeletal: No pain, swelling, or focal weakness  Heme: No bleeding or bruising problems.   Immuno: No hx of recurrent infections        LIMITATION TO HISTORY   None  OUTSIDE HISTORIAN(S):  None        PAST FAM HISTORY  History reviewed. No pertinent family history.    PAST MEDICAL HISTORY   has a past medical history of History of  (10/9/2009) and Migraine.    SOCIAL HISTORY  Social History     Tobacco Use    Smoking status: Never    Smokeless tobacco: Never   Substance and Sexual Activity    Alcohol use: No    Drug use: No    Sexual activity: Yes     Partners: Male     Birth control/protection: Surgical     Comment: BTL       SURGICAL HISTORY   has a past surgical history that includes  delivery only; primary c section (); repeat c section (2014); and  "repeat c section w tubal ligation (Bilateral, 2/25/2018).    CURRENT MEDICATIONS  Home Medications       Reviewed by Angely Kraus R.N. (Registered Nurse) on 01/01/25 at 1558  Med List Status: Partial     Medication Last Dose Status   docusate sodium 100 MG Cap  Active   ibuprofen (MOTRIN) 600 MG Tab  Active   Prenatal MV-Min-Fe Fum-FA-DHA (PRENATAL 1 PO)  Active                     ALLERGIES  No Known Allergies    PHYSICAL EXAM  VITAL SIGNS: /86   Pulse 93   Temp 36.7 °C (98 °F) (Temporal)   Resp 18   Ht 1.575 m (5' 2\")   Wt 83.6 kg (184 lb 4.9 oz)   SpO2 98%   BMI 33.71 kg/m²    Gen: Alert in no apparent distress.  HEENT: Markedly erythemic and no signs of trauma,  edematous posterior pharynx with bilateral mild tonsillar exudate.  Tonsils are nearly touching uvula which appears mildly edematous as well.Bilateral external ears normal, Nose normal. Conjunctiva normal, Non-icteric.  Patient appears to be tolerating her own secretions but is phonating softly.  Neck:  No tenderness, Supple, No masses  Lymphatic: Bilateral peritracheal cervical adenopathy noted.  Subcentimeter, no overlying skin changes.  Mildly tender.  Cardiovascular: Tachycardia with regular rhythm.  Capillary refill less than 3 seconds to all extremities, 2+ distal pulses.  Thorax & Lungs: Normal breath sounds, No respiratory distress, No wheezing bilateral chest rise  Abdomen: No distention  Skin: Warm, Dry, No erythema, No rash noted to exposed areas.   Extremities: Intact distal pulses, No edema  Neurologic: Alert , no facial droop, grossly normal coordination and strength  Psychiatric: Affect pleasant    INITIAL IMPRESSION  Patient arrives for evaluation of what appears to be severe pharyngitis.  Her viral screening is negative but she will need strep screening and, given the size of her tonsils, I will give her IV Decadron, and Toradol for now.  The pain is in the back of her throat and is not any deeper.  I do not suspect " epiglottitis at this time and she does not appear toxic.  She is mildly tachycardic which is to be expected given her condition, and I will consider further lab screening evaluation or imaging if she deteriorates while we await the strep screen.    ED observation? No  LABS  Results for orders placed or performed during the hospital encounter of 01/01/25   POC CoV-2, FLU A/B, RSV by PCR    Collection Time: 01/01/25  1:24 PM   Result Value Ref Range    POC Influenza A RNA, PCR Negative Negative    POC Influenza B RNA, PCR Negative Negative    POC RSV, by PCR Negative Negative    POC SARS-CoV-2, PCR NotDetected NotDetected   Group A Strep by PCR    Collection Time: 01/01/25  2:25 PM    Specimen: Throat   Result Value Ref Range    Group A Strep by PCR DETECTED (A) Not Detected   HCG QUALITATIVE UR (Lab)    Collection Time: 01/01/25  2:25 PM   Result Value Ref Range    Beta-Hcg Urine Negative Negative         ASSESSMENT, COURSE AND PLAN  Care Narrative: Patient was reevaluated after strep screen had returned.  She is feeling better after Decadron and Toradol and states understanding that she has strep pharyngitis.  Given the difficulty swallowing, I gave her the option of getting an injection of Bicillin to treat the infection and a single dose before she is discharged.  She is accepting of this and is comfortable with this plan.  She still does not appear septic or toxic and is able to drink fluids.  I feel she can be treated empirically at home for the pain and inflammation unless her symptoms worsen or change, in which case she would need to return for reevaluation.  She states understanding and is comfortable with this plan.          I have discussed management of the patient with the following physicians and MARY's: None    Escalation of care considered, and ultimately not performed: Serum evaluation, imaging    Barriers to care at this time, including but not limited to: none     Decision tools and Rx drugs  considered including, but not limited to : none    Discussion of management with other QHP or appropriate source(s): None    The patient will not drink alcohol nor drive with prescribed medications. The patient will return for worsening symptoms and is stable at the time of discharge. The patient verbalizes understanding and will comply.    FINAL IMPRESSION  1. Streptococcal pharyngitis        Electronically signed by: Gavin Delarosa M.D., 1/1/2025 1:35 PM

## 2025-01-01 NOTE — Clinical Note
Bisi Mondragon was seen and treated in our emergency department on 1/1/2025.  She may return to work on 01/07/2025.       If you have any questions or concerns, please don't hesitate to call.      Gavin Delarosa M.D.

## 2025-01-01 NOTE — ED TRIAGE NOTES
Chief Complaint   Patient presents with    Sore Throat     X2 days. Pt reports painful swallowing, denies difficulty breathing.     Cough     Dry non productive cough x1 week. States she hasn't been feeling well for a week, using over the counter meds with no relief.       Patient ambulatory to triage for above complaint. Patient A&Ox4, GCS 15, patient speaking in full sentences. Equal and unlabored respirations. Patient educated on triage process and encouraged to notify staff if condition worsens. Appropriate protocols ordered. Patient returned to the lobby in stable condition.

## 2025-03-03 ENCOUNTER — HOSPITAL ENCOUNTER (EMERGENCY)
Facility: MEDICAL CENTER | Age: 35
End: 2025-03-03
Attending: EMERGENCY MEDICINE
Payer: COMMERCIAL

## 2025-03-03 ENCOUNTER — APPOINTMENT (OUTPATIENT)
Dept: RADIOLOGY | Facility: MEDICAL CENTER | Age: 35
End: 2025-03-03
Attending: EMERGENCY MEDICINE
Payer: COMMERCIAL

## 2025-03-03 ENCOUNTER — PHARMACY VISIT (OUTPATIENT)
Dept: PHARMACY | Facility: MEDICAL CENTER | Age: 35
End: 2025-03-03
Payer: COMMERCIAL

## 2025-03-03 VITALS
BODY MASS INDEX: 34.32 KG/M2 | HEIGHT: 62 IN | OXYGEN SATURATION: 95 % | HEART RATE: 109 BPM | DIASTOLIC BLOOD PRESSURE: 78 MMHG | SYSTOLIC BLOOD PRESSURE: 122 MMHG | RESPIRATION RATE: 18 BRPM | TEMPERATURE: 99.2 F | WEIGHT: 186.51 LBS

## 2025-03-03 DIAGNOSIS — J02.0 STREP PHARYNGITIS: ICD-10-CM

## 2025-03-03 DIAGNOSIS — R59.9 REACTIVE LYMPHADENOPATHY: ICD-10-CM

## 2025-03-03 LAB
ALBUMIN SERPL BCP-MCNC: 3.7 G/DL (ref 3.2–4.9)
ALBUMIN/GLOB SERPL: 1 G/DL
ALP SERPL-CCNC: 71 U/L (ref 30–99)
ALT SERPL-CCNC: 22 U/L (ref 2–50)
ANION GAP SERPL CALC-SCNC: 13 MMOL/L (ref 7–16)
AST SERPL-CCNC: 26 U/L (ref 12–45)
BASOPHILS # BLD AUTO: 0.2 % (ref 0–1.8)
BASOPHILS # BLD: 0.02 K/UL (ref 0–0.12)
BILIRUB SERPL-MCNC: 0.4 MG/DL (ref 0.1–1.5)
BUN SERPL-MCNC: 4 MG/DL (ref 8–22)
CALCIUM ALBUM COR SERPL-MCNC: 9 MG/DL (ref 8.5–10.5)
CALCIUM SERPL-MCNC: 8.8 MG/DL (ref 8.5–10.5)
CHLORIDE SERPL-SCNC: 107 MMOL/L (ref 96–112)
CO2 SERPL-SCNC: 17 MMOL/L (ref 20–33)
CREAT SERPL-MCNC: 0.65 MG/DL (ref 0.5–1.4)
EOSINOPHIL # BLD AUTO: 0.18 K/UL (ref 0–0.51)
EOSINOPHIL NFR BLD: 1.5 % (ref 0–6.9)
ERYTHROCYTE [DISTWIDTH] IN BLOOD BY AUTOMATED COUNT: 44.6 FL (ref 35.9–50)
FLUAV RNA SPEC QL NAA+PROBE: NEGATIVE
FLUBV RNA SPEC QL NAA+PROBE: NEGATIVE
GFR SERPLBLD CREATININE-BSD FMLA CKD-EPI: 118 ML/MIN/1.73 M 2
GLOBULIN SER CALC-MCNC: 3.7 G/DL (ref 1.9–3.5)
GLUCOSE SERPL-MCNC: 103 MG/DL (ref 65–99)
HCG SERPL QL: NEGATIVE
HCT VFR BLD AUTO: 40 % (ref 37–47)
HETEROPH AB SER QL: NEGATIVE
HGB BLD-MCNC: 12.1 G/DL (ref 12–16)
IMM GRANULOCYTES # BLD AUTO: 0.05 K/UL (ref 0–0.11)
IMM GRANULOCYTES NFR BLD AUTO: 0.4 % (ref 0–0.9)
LACTATE SERPL-SCNC: 0.9 MMOL/L (ref 0.5–2)
LYMPHOCYTES # BLD AUTO: 1.39 K/UL (ref 1–4.8)
LYMPHOCYTES NFR BLD: 11.9 % (ref 22–41)
MCH RBC QN AUTO: 24.4 PG (ref 27–33)
MCHC RBC AUTO-ENTMCNC: 30.3 G/DL (ref 32.2–35.5)
MCV RBC AUTO: 80.6 FL (ref 81.4–97.8)
MONOCYTES # BLD AUTO: 0.92 K/UL (ref 0–0.85)
MONOCYTES NFR BLD AUTO: 7.9 % (ref 0–13.4)
NEUTROPHILS # BLD AUTO: 9.15 K/UL (ref 1.82–7.42)
NEUTROPHILS NFR BLD: 78.1 % (ref 44–72)
NRBC # BLD AUTO: 0 K/UL
NRBC BLD-RTO: 0 /100 WBC (ref 0–0.2)
PLATELET # BLD AUTO: 339 K/UL (ref 164–446)
PMV BLD AUTO: 9.7 FL (ref 9–12.9)
POTASSIUM SERPL-SCNC: 3.8 MMOL/L (ref 3.6–5.5)
PROT SERPL-MCNC: 7.4 G/DL (ref 6–8.2)
RBC # BLD AUTO: 4.96 M/UL (ref 4.2–5.4)
RSV RNA SPEC QL NAA+PROBE: NEGATIVE
S PYO DNA SPEC NAA+PROBE: DETECTED
SARS-COV-2 RNA RESP QL NAA+PROBE: NOTDETECTED
SODIUM SERPL-SCNC: 137 MMOL/L (ref 135–145)
WBC # BLD AUTO: 11.7 K/UL (ref 4.8–10.8)

## 2025-03-03 PROCEDURE — 0241U HCHG SARS-COV-2 COVID-19 NFCT DS RESP RNA 4 TRGT ED POC: CPT

## 2025-03-03 PROCEDURE — 87651 STREP A DNA AMP PROBE: CPT

## 2025-03-03 PROCEDURE — 85025 COMPLETE CBC W/AUTO DIFF WBC: CPT

## 2025-03-03 PROCEDURE — 86308 HETEROPHILE ANTIBODY SCREEN: CPT

## 2025-03-03 PROCEDURE — RXOTC WILLOW AMBULATORY OTC CHARGE

## 2025-03-03 PROCEDURE — A9270 NON-COVERED ITEM OR SERVICE: HCPCS | Mod: UD | Performed by: EMERGENCY MEDICINE

## 2025-03-03 PROCEDURE — 99284 EMERGENCY DEPT VISIT MOD MDM: CPT

## 2025-03-03 PROCEDURE — 36415 COLL VENOUS BLD VENIPUNCTURE: CPT

## 2025-03-03 PROCEDURE — 71045 X-RAY EXAM CHEST 1 VIEW: CPT

## 2025-03-03 PROCEDURE — 700111 HCHG RX REV CODE 636 W/ 250 OVERRIDE (IP): Mod: JZ,UD | Performed by: EMERGENCY MEDICINE

## 2025-03-03 PROCEDURE — 70491 CT SOFT TISSUE NECK W/DYE: CPT

## 2025-03-03 PROCEDURE — 87040 BLOOD CULTURE FOR BACTERIA: CPT

## 2025-03-03 PROCEDURE — 700117 HCHG RX CONTRAST REV CODE 255: Performed by: EMERGENCY MEDICINE

## 2025-03-03 PROCEDURE — RXMED WILLOW AMBULATORY MEDICATION CHARGE: Performed by: EMERGENCY MEDICINE

## 2025-03-03 PROCEDURE — 84703 CHORIONIC GONADOTROPIN ASSAY: CPT

## 2025-03-03 PROCEDURE — 700102 HCHG RX REV CODE 250 W/ 637 OVERRIDE(OP): Mod: UD | Performed by: EMERGENCY MEDICINE

## 2025-03-03 PROCEDURE — 700105 HCHG RX REV CODE 258: Mod: UD | Performed by: EMERGENCY MEDICINE

## 2025-03-03 PROCEDURE — 83605 ASSAY OF LACTIC ACID: CPT

## 2025-03-03 PROCEDURE — 80053 COMPREHEN METABOLIC PANEL: CPT

## 2025-03-03 RX ORDER — AMOXICILLIN 500 MG/1
500 CAPSULE ORAL 2 TIMES DAILY
Qty: 20 CAPSULE | Refills: 0 | Status: ACTIVE | OUTPATIENT
Start: 2025-03-03 | End: 2025-03-13

## 2025-03-03 RX ORDER — DEXAMETHASONE SODIUM PHOSPHATE 10 MG/ML
10 INJECTION, SOLUTION INTRAMUSCULAR; INTRAVENOUS ONCE
Status: COMPLETED | OUTPATIENT
Start: 2025-03-03 | End: 2025-03-03

## 2025-03-03 RX ORDER — SODIUM CHLORIDE 9 MG/ML
1000 INJECTION, SOLUTION INTRAVENOUS ONCE
Status: COMPLETED | OUTPATIENT
Start: 2025-03-03 | End: 2025-03-03

## 2025-03-03 RX ORDER — AMOXICILLIN 500 MG/1
500 CAPSULE ORAL ONCE
Status: COMPLETED | OUTPATIENT
Start: 2025-03-03 | End: 2025-03-03

## 2025-03-03 RX ADMIN — AMOXICILLIN 500 MG: 500 CAPSULE ORAL at 06:23

## 2025-03-03 RX ADMIN — IOHEXOL 80 ML: 350 INJECTION, SOLUTION INTRAVENOUS at 06:00

## 2025-03-03 RX ADMIN — DEXAMETHASONE SODIUM PHOSPHATE 10 MG: 10 INJECTION, SOLUTION INTRAMUSCULAR; INTRAVENOUS at 06:23

## 2025-03-03 RX ADMIN — SODIUM CHLORIDE 1000 ML: 9 INJECTION, SOLUTION INTRAVENOUS at 05:03

## 2025-03-03 NOTE — DISCHARGE INSTRUCTIONS
It appears that you have strep throat and reactive lymph nodes in your neck.  Please follow-up with your primary care physician after your symptoms to go away to assure that there are no longer lymph nodes in this area.  It is very unlikely to represent lymphoma but it is possible and this was important to recheck with your primary about.

## 2025-03-03 NOTE — ED NOTES
Erp will order sepsis protocol for the pt due to hx of fever and increase HR- ranging 121-123bpm-

## 2025-03-03 NOTE — ED TRIAGE NOTES
Bisi Mondragon  34 y.o. female  Chief Complaint   Patient presents with    Sore Throat     X3 days, pt has swollen lymph node on the right side of her neck with a sore throat. Pt states she's been having a fever.      Pt ambulated to triage. Covid/flu and strep done in triage.     Pt is alert, oriented, and follows commands. Pt speaking in full sentences and responds appropriately to questions. No acute distress noted in triage. Respirations are even and unlabored.     Pt to lobby and educated on triage process. Pt encouraged to alert triage staff for any changes in condition. Pt signed up for messaging updates prior to leaving triage room.    Vitals:    03/03/25 0356   BP: 133/84   Pulse: (!) 115   Resp: 18   Temp: 36.8 °C (98.2 °F)   SpO2: 95%

## 2025-03-03 NOTE — ED NOTES
Taken patient from triage waiting room by Er tech , ambulatory with steady gait, alert/ oriented x 4.Verified patient identification.  Assumed patient care.   Placed on patient room. Changed clothes to hospital gown. Connected to cardiac monitor.   Given the call light and instructed to call for any assistance needed/ or concerns.   Bed on lowest position, side rails up, breaks locked. Awaiting for ERP.

## 2025-03-03 NOTE — ED PROVIDER NOTES
ER Provider Note    Scribed for Dr. Stevie Chapman M.D. by Sandra Song. 3/3/2025  4:33 AM    Primary Care Provider: None pertinent     CHIEF COMPLAINT  Chief Complaint   Patient presents with    Sore Throat     X3 days, pt has swollen lymph node on the right side of her neck with a sore throat. Pt states she's been having a fever.        EXTERNAL RECORDS REVIEWED  Inpatient Notes  and tubal ligation in 2018    HPI/ROS  LIMITATION TO HISTORY   Select: : None    OUTSIDE HISTORIAN(S):   at bedside    Bisi Mondragon is a 34 y.o. female who presents to the ED for evaluation of sore throat onset three days ago. She reports swollen lymph nodes, difficulty swallowing, chest discomfort, abdominal discomfort. and fever, but denies feeling hot, sick contacts at home, cough, or ear pain. She has been taking acetaminophen, which is ineffective. Patient adds that she had strep throat about one month ago without any of these current symptoms. No alleviating or exacerbating factors noted.     PAST MEDICAL HISTORY  Past Medical History:   Diagnosis Date    History of  10/9/2009    Migraine        SURGICAL HISTORY  Past Surgical History:   Procedure Laterality Date    REPEAT C SECTION W TUBAL LIGATION Bilateral 2018    Procedure: REPEAT C SECTION WITH TUBAL LIGATION;  Surgeon: Malcolm Roberts M.D.;  Location: [facility];  Service: Labor and Delivery    REPEAT C SECTION  2014    Performed by Demarco Patel M.D. at [facility]    PRIMARY C SECTION      [facility]      failure to progress        FAMILY HISTORY  History reviewed. No pertinent family history.    SOCIAL HISTORY   reports that she has never smoked. She has never used smokeless tobacco. She reports that she does not drink alcohol and does not use drugs.    CURRENT MEDICATIONS  Discharge Medication List as of 3/3/2025  6:41 AM        CONTINUE these medications which have NOT CHANGED     "Details   ibuprofen (MOTRIN) 600 MG Tab Take 1 Tab by mouth every 6 hours as needed (For cramping after delivery; do not give if patient is receiving ketorolac (Toradol))., Disp-30 Tab, R-0, Print Rx Paper      docusate sodium 100 MG Cap Take 100 mg by mouth 2 times a day as needed for Constipation., Disp-60 Cap, R-0, Print Rx Paper      Prenatal MV-Min-Fe Fum-FA-DHA (PRENATAL 1 PO) Take  by mouth., Historical Med             ALLERGIES  Patient has no known allergies.    PHYSICAL EXAM  /84   Pulse (!) 115   Temp 36.8 °C (98.2 °F) (Temporal)   Resp 18   Ht 1.575 m (5' 2\")   Wt 84.6 kg (186 lb 8.2 oz)   SpO2 95%   BMI 34.11 kg/m²   Constitutional: Alert in no apparent distress.  HENT: No signs of trauma, Bilateral external ears normal, Nose normal. Redness of the posterior oropharynx, Tenderness over the right anterior neck right below ear, Tenderness just below the right ear, No mastoid tenderness, TMs normal l bilaterally   Eyes: Pupils are equal and reactive, Conjunctiva normal, Non-icteric.   Neck: Normal range of motion, No tenderness, Supple,   Cardiovascular: Tachycardic rate and rhythm, no murmurs.   Thorax & Lungs: Normal breath sounds, No respiratory distress, No wheezing, No chest tenderness.   Abdomen: Bowel sounds normal, Soft, No tenderness,   Skin: Warm, Dry, No erythema, No rash.   Back: No bony tenderness, No CVA tenderness.   Extremities: No edema, No tenderness, No cyanosis  Neurologic: Alert, Grossly non-focal.  Psychiatric: Affect normal     DIAGNOSTIC STUDIES & PROCEDURES    Labs:   Labs Reviewed   GROUP A STREP BY PCR - Abnormal; Notable for the following components:       Result Value    Group A Strep by PCR DETECTED (*)     All other components within normal limits   CBC WITH DIFFERENTIAL - Abnormal; Notable for the following components:    WBC 11.7 (*)     MCV 80.6 (*)     MCH 24.4 (*)     MCHC 30.3 (*)     Neutrophils-Polys 78.10 (*)     Lymphocytes 11.90 (*)     Neutrophils " (Absolute) 9.15 (*)     Monos (Absolute) 0.92 (*)     All other components within normal limits   COMP METABOLIC PANEL - Abnormal; Notable for the following components:    Co2 17 (*)     Glucose 103 (*)     Bun 4 (*)     Globulin 3.7 (*)     All other components within normal limits   LACTIC ACID   BLOOD CULTURE   BLOOD CULTURE   HCG QUAL SERUM   HETEROPHILE AB SCREEN   ESTIMATED GFR   POCT COV-2, FLU A/B, RSV BY PCR   POC COV-2, FLU A/B, RSV BY PCR   All labs reviewed by me.    Radiology:   The attending Emergency Physician has independently interpreted the diagnostic imaging associated with this visit and is awaiting the final reading from the radiologist, which will be displayed below.    Preliminary interpretation is a follows: Enlarged lymph nodes noted, no deep space abscess  Radiologist interpretation: See below.    CT-SOFT TISSUE NECK WITH   Final Result         1. Markedly enlarged right anterior and posterior chain cervical nodes with mild adjacent inflammation. Mild enlargement left anterior chain nodes. Lymphoma not excluded. The inflammation suggests possible infection. Clinical correlation is needed.      2. Mild tonsillar enlargement.      3. No abscess or fluid collection.      4. Mild thyromegaly.      5. No sinus fluid.                  DX-CHEST-PORTABLE (1 VIEW)   Final Result         1. No acute cardiopulmonary abnormalities identified.                          COURSE & MEDICAL DECISION MAKING    ED Observation Status? No; Patient does not meet criteria for ED Observation.     INITIAL ASSESSMENT AND PLAN  Care Narrative:       4:33 AM - Patient seen and evaluated at bedside for sore throat with associated fever. Discussed plan of care, including obtaining a diagnostic workup of labs and imaging. Patient agrees to plan of care. Patient will be treated with fluids for her symptoms. Ordered chest x-ray, CT-soft tissue neck w/, lactic acid, CBC with differential, CMP, blood cultures x2, HCG qual  serum, Mononucleosis test qual, Group A Strep by PCR, and POC CoV-2 Flu A/B and RSV PCR to evaluate.    HYDRATION: Based on the patient's presentation of Tachycardia the patient was given IV fluids. IV Hydration was used because oral hydration was not adequate alone. Upon recheck following hydration, the patient was improved.    6:16 AM - Lab results reviewed. Patient will be treated with Decadron 10 mg IV and amoxicillin 500 mg PO.     6:24 AM - I reevaluated the patient at bedside. The patient informs me they feel improved following medication administration. I discussed the patient's diagnostic study results which show positive result for strep throat. I discussed plan for discharge and follow up as outlined below. The patient is stable for discharge at this time and will return for any new or worsening symptoms. Patient verbalizes understanding and support with my plan for discharge.       ADDITIONAL PROBLEM LIST AND DISPOSITION  Patient with sore throat and neck pain.  Noted to have painful lymph nodes.  CT scan shows no deep space infection.  Positive for strep.  Leukocytosis of 11.  Tachycardia improving.  Fluids helped.  No pneumonia.  No influenza.  Will discharge the patient home with strict return precautions and follow-up.               DISPOSITION AND DISCUSSIONS  I have discussed management of the patient with the following physicians and MARY's: None    Discussion of management with other Q or appropriate source(s): None     Escalation of care considered, and ultimately not performed: acute inpatient care management, however at this time, the patient is most appropriate for outpatient management.    Barriers to care at this time, including but not limited to: Patient does not have established PCP.     Decision tools and prescription drugs considered including, but not limited to: Antibiotics Amoxicillin 500 mg .    The patient will return for new or worsening symptoms and is stable at the time of  discharge.    DISPOSITION:  Patient will be discharged home in stable condition.    FOLLOW UP:  No follow-up provider specified.    OUTPATIENT MEDICATIONS:  Discharge Medication List as of 3/3/2025  6:41 AM        START taking these medications    Details   amoxicillin (AMOXIL) 500 MG Cap Take 1 Capsule by mouth 2 times a day for 10 days., Disp-20 Capsule, R-0, Normal               FINAL IMPRESSION   1. Strep pharyngitis    2. Reactive lymphadenopathy         Sandra AMADO), am scribing for, and in the presence of, Stevie Chapman M.D..    Electronically signed by: Sandra Song (Sejal), 3/3/2025    IStevie M.D. personally performed the services described in this documentation, as scribed by Sandra Song in my presence, and it is both accurate and complete.    The note accurately reflects work and decisions made by me.  Stevie Chapman M.D.  3/3/2025  10:49 AM

## 2025-03-08 LAB
BACTERIA BLD CULT: NORMAL
BACTERIA BLD CULT: NORMAL
SIGNIFICANT IND 70042: NORMAL
SIGNIFICANT IND 70042: NORMAL
SITE SITE: NORMAL
SITE SITE: NORMAL
SOURCE SOURCE: NORMAL
SOURCE SOURCE: NORMAL

## 2025-04-28 ENCOUNTER — HOSPITAL ENCOUNTER (EMERGENCY)
Facility: MEDICAL CENTER | Age: 35
End: 2025-04-28
Payer: COMMERCIAL

## 2025-04-28 VITALS
DIASTOLIC BLOOD PRESSURE: 91 MMHG | HEART RATE: 105 BPM | HEIGHT: 62 IN | OXYGEN SATURATION: 99 % | RESPIRATION RATE: 14 BRPM | TEMPERATURE: 97.9 F | BODY MASS INDEX: 34.32 KG/M2 | WEIGHT: 186.51 LBS | SYSTOLIC BLOOD PRESSURE: 142 MMHG

## 2025-04-28 PROCEDURE — 302449 STATCHG TRIAGE ONLY (STATISTIC)

## 2025-04-28 ASSESSMENT — FIBROSIS 4 INDEX: FIB4 SCORE: 0.57

## 2025-04-29 NOTE — ED TRIAGE NOTES
"Chief Complaint   Patient presents with    Abdominal Pain     Suprapubic pain radiating to right and left. Onset yesterday afternoon. Describes as contractions. Has had tubal ligation. - vaginal bleeding. Increased urination, - burning. Reports need to defecate when she feels pain. LBM today. Chills.      Pt ambulates from lobby with steady gait. Skin signs: normal color, warm, dry. Pt speaking full sentences, A & O x 4. Respirations equal and unlabored. Pt educated on triage process and to alert staff of any changing conditions. Pt returned to phlebotomy.     BP (!) 142/91   Pulse (!) 105   Temp 36.6 °C (97.9 °F) (Temporal)   Resp 14   Ht 1.575 m (5' 2\")   Wt 84.6 kg (186 lb 8.2 oz)   SpO2 99%   BMI 34.11 kg/m²       "

## 2025-05-09 ENCOUNTER — APPOINTMENT (OUTPATIENT)
Dept: MEDICAL GROUP | Facility: CLINIC | Age: 35
End: 2025-05-09
Payer: COMMERCIAL

## (undated) DEVICE — KIT  I.V. START (100EA/CA)

## (undated) DEVICE — SUTURE 2/0 GUT-PLAIN (36PK/BX)

## (undated) DEVICE — 0 CHROMIC CT-1

## (undated) DEVICE — GLOVE, BIOGEL ECLIPSE, SZ 7.0, PF LTX (50/BX)

## (undated) DEVICE — SET EXTENSION WITH 2 PORTS (48EA/CA) ***PART #2C8610 IS A SUBSTITUTE*****

## (undated) DEVICE — HEAD HOLDER JUNIOR/ADULT

## (undated) DEVICE — WATER IRRIG. STER. 1500 ML - (9/CA)

## (undated) DEVICE — ELECTRODE DUAL RETURN W/ CORD - (50/PK)

## (undated) DEVICE — SODIUM CHL IRRIGATION 0.9% 1000ML (12EA/CA)

## (undated) DEVICE — TUBING CLEARLINK DUO-VENT - C-FLO (48EA/CA)

## (undated) DEVICE — CLIPS FILSHIE FOR TUBAL - LIGATION SYSTEM (20PR/BX)

## (undated) DEVICE — STAPLER SKIN DISP - (6/BX 10BX/CA) VISISTAT

## (undated) DEVICE — PACK C-SECTION (2EA/CA)

## (undated) DEVICE — PAD LAP STERILE 18 X 18 - (5/PK 40PK/CA)

## (undated) DEVICE — DETERGENT RENUZYME PLUS 10 OZ PACKET (50/BX)

## (undated) DEVICE — TRAY SPINAL ANESTHESIA NON-SAFETY (10/CA)

## (undated) DEVICE — CATHETER IV NON-SAFETY 18 GA X 1 1/4 (50/BX 4BX/CA)

## (undated) DEVICE — SUTURE 0 VICRYL PLUS CT-1 - 36 INCH (36/BX)